# Patient Record
Sex: FEMALE | Race: WHITE | Employment: FULL TIME | URBAN - METROPOLITAN AREA
[De-identification: names, ages, dates, MRNs, and addresses within clinical notes are randomized per-mention and may not be internally consistent; named-entity substitution may affect disease eponyms.]

---

## 2023-12-14 DIAGNOSIS — R10.11 RIGHT UPPER QUADRANT PAIN: Primary | ICD-10-CM

## 2023-12-19 DIAGNOSIS — D72.828 OTHER ELEVATED WHITE BLOOD CELL (WBC) COUNT: Primary | ICD-10-CM

## 2024-01-05 ENCOUNTER — INITIAL PRENATAL (OUTPATIENT)
Dept: OBGYN CLINIC | Age: 29
End: 2024-01-05

## 2024-01-05 VITALS
HEIGHT: 59 IN | SYSTOLIC BLOOD PRESSURE: 102 MMHG | WEIGHT: 128.2 LBS | DIASTOLIC BLOOD PRESSURE: 72 MMHG | BODY MASS INDEX: 25.84 KG/M2

## 2024-01-05 DIAGNOSIS — Z87.09 HISTORY OF ASTHMA: ICD-10-CM

## 2024-01-05 DIAGNOSIS — Z83.3 FH: TYPE 1 DIABETES: ICD-10-CM

## 2024-01-05 DIAGNOSIS — Z3A.10 10 WEEKS GESTATION OF PREGNANCY: Primary | ICD-10-CM

## 2024-01-05 DIAGNOSIS — Z86.59 HISTORY OF ANXIETY: ICD-10-CM

## 2024-01-05 NOTE — PROGRESS NOTES
Value Ref Range    Testosterone 47 20 - 70 ng/dL    Sex Hormone Binding 59 30 - 135 nmol/L    Testosterone, Free 5.8 0.8 - 7.4 pg/mL    Testosterone, Bioavailable 13.5 2.2 - 20.6 ng/dL   DHEA-Sulfate    Collection Time: 10/09/23  8:34 AM   Result Value Ref Range    DHEAS (DHEA Sulfate) 360.0 65 - 380 ug/dL   T4, Free    Collection Time: 10/09/23  8:34 AM   Result Value Ref Range    T4 Free 1.3 0.9 - 1.7 ng/dL   TSH    Collection Time: 10/09/23  8:34 AM   Result Value Ref Range    TSH 4.35 0.30 - 5.00 uIU/mL   Follicle Stimulating Hormone    Collection Time: 10/09/23  8:34 AM   Result Value Ref Range    FSH 6.2 mIU/mL   POCT urine pregnancy    Collection Time: 10/25/23  1:40 PM   Result Value Ref Range    Preg Test, Ur Negative     Control Present    POCT Urinalysis no Micro    Collection Time: 10/25/23  1:40 PM   Result Value Ref Range    Color, UA Yellow     Clarity, UA Clear     Glucose, UA POC Negative     Bilirubin, UA Negative     Ketones, UA Negative     Spec Grav, UA <1.005     Blood, UA POC Trace-lysed     pH, UA 6.0     Protein, UA POC Negative     Urobilinogen, UA 0.2     Leukocytes, UA Negative     Nitrite, UA Negative    HIV Screen    Collection Time: 12/12/23  2:20 PM   Result Value Ref Range    HIV Ag/Ab NONREACTIVE NONREACTIVE   Comprehensive Metabolic Panel    Collection Time: 12/12/23  2:20 PM   Result Value Ref Range    Sodium 137 136 - 145 mmol/L    Potassium 4.1 3.7 - 5.3 mmol/L    Chloride 102 98 - 107 mmol/L    CO2 25 20 - 31 mmol/L    Anion Gap 10 9 - 16 mmol/L    Glucose 82 74 - 99 mg/dL    BUN 10 6 - 20 mg/dL    Creatinine 0.6 0.50 - 0.90 mg/dL    Est, Glom Filt Rate >60 >60 mL/min/1.73m2    Calcium 9.2 8.6 - 10.4 mg/dL    Total Protein 6.9 6.6 - 8.7 g/dL    Albumin 4.2 3.5 - 5.2 g/dL    Albumin/Globulin Ratio 2.0 1.0 - 2.5    Total Bilirubin 0.2 0.00 - 1.20 mg/dL    Alkaline Phosphatase 55 35 - 104 U/L    ALT 17 10 - 35 U/L    AST 20 10 - 35 U/L   Cystic fibrosis carrier study    Collection

## 2024-01-12 LAB
Lab: NORMAL
NTRA FETAL FRACTION: NORMAL
NTRA GENDER OF FETUS: NORMAL
NTRA MONOSOMY X AGE-BASED RISK TEXT: NORMAL
NTRA MONOSOMY X RESULT TEXT: NORMAL
NTRA MONOSOMY X RISK SCORE TEXT: NORMAL
NTRA TRIPLOIDY RESULT TEXT: NORMAL
NTRA TRISOMY 13 AGE-BASED RISK TEXT: NORMAL
NTRA TRISOMY 13 RESULT TEXT: NORMAL
NTRA TRISOMY 13 RISK SCORE TEXT: NORMAL
NTRA TRISOMY 18 AGE-BASED RISK TEXT: NORMAL
NTRA TRISOMY 18 RESULT TEXT: NORMAL
NTRA TRISOMY 18 RISK SCORE TEXT: NORMAL
NTRA TRISOMY 21 AGE-BASED RISK TEXT: NORMAL
NTRA TRISOMY 21 RESULT TEXT: NORMAL
NTRA TRISOMY 21 RISK SCORE TEXT: NORMAL

## 2024-01-18 PROBLEM — R87.611 ATYPICAL SQUAMOUS CELLS CANNOT EXCLUDE HIGH GRADE SQUAMOUS INTRAEPITHELIAL LESION ON CYTOLOGIC SMEAR OF CERVIX (ASC-H): Status: RESOLVED | Noted: 2023-05-01 | Resolved: 2024-01-18

## 2024-01-19 ENCOUNTER — HOSPITAL ENCOUNTER (OUTPATIENT)
Age: 29
Setting detail: SPECIMEN
Discharge: HOME | End: 2024-01-19

## 2024-01-19 ENCOUNTER — ROUTINE PRENATAL (OUTPATIENT)
Dept: OBGYN CLINIC | Age: 29
End: 2024-01-19

## 2024-01-19 VITALS
HEIGHT: 59 IN | SYSTOLIC BLOOD PRESSURE: 111 MMHG | DIASTOLIC BLOOD PRESSURE: 78 MMHG | WEIGHT: 129 LBS | BODY MASS INDEX: 26 KG/M2 | HEART RATE: 105 BPM

## 2024-01-19 DIAGNOSIS — Z83.49 FAMILY HISTORY OF HASHIMOTO THYROIDITIS: ICD-10-CM

## 2024-01-19 DIAGNOSIS — R42 DIZZINESS: ICD-10-CM

## 2024-01-19 DIAGNOSIS — Z3A.12 12 WEEKS GESTATION OF PREGNANCY: ICD-10-CM

## 2024-01-19 DIAGNOSIS — Z3A.12 12 WEEKS GESTATION OF PREGNANCY: Primary | ICD-10-CM

## 2024-01-19 DIAGNOSIS — Z86.59 HISTORY OF ANXIETY: ICD-10-CM

## 2024-01-19 LAB
EST. AVERAGE GLUCOSE BLD GHB EST-MCNC: 80 MG/DL
HBA1C MFR BLD: 4.4 % (ref 4–6)

## 2024-01-19 PROCEDURE — 0502F SUBSEQUENT PRENATAL CARE: CPT

## 2024-01-19 RX ORDER — FOLIC ACID, .BETA.-CAROTENE, ASCORBIC ACID, CHOLECALCIFEROL, .ALPHA.-TOCOPHEROL ACETATE, DL-, THIAMINE MONONITRATE, RIBOFLAVIN, NIACINAMIDE, PYRIDOXINE HYDROCHLORIDE, CYANOCOBALAMIN, CALCIUM PANTOTHENATE, CALCIUM CARBONATE, FERROUS FUMARATE, AND ZINC OXIDE 1; 1000; 100; 400; 30; 3; 3; 15; 20; 12; 7; 200; 29; 20 MG/1; [IU]/1; MG/1; [IU]/1; [IU]/1; MG/1; MG/1; MG/1; MG/1; UG/1; MG/1; MG/1; MG/1; MG/1
1 TABLET, CHEWABLE ORAL DAILY
Qty: 90 TABLET | Refills: 3 | Status: SHIPPED | OUTPATIENT
Start: 2024-01-19 | End: 2025-01-13

## 2024-01-19 NOTE — PROGRESS NOTES
[facility] [place]  [address]  [address]  [place] [address]  Dept: [phone]    PCP: Beulah Guidry, VANESSA - CNP     Chief Complaint   Patient presents with    Routine Prenatal Visit     12.6wks CAROLYN 7..24 ABO A+  [place]ckin 1.18.24  [employer] ins.    Pt states feels light headed, hrt spikes and can feel black out spells      Deyanira Marino  is a  who presents to the clinic today at 12w6d for routine prenatal monitoring. She has no unusual complaints. Reports no fetal movement, denies VB, ctx or LOF    Dizziness upon standing, vision goes black. This lasts less than 2 min. She does wear a fitness tracker and notes her HR goes up to 140 bpm, feels palpitations. This only happens first thing in the morning. At these times she feels faint but has not fainted    Staying hydrated. Drinking water and gatorade  Few episodes of diarrhea a few times per week but this is typically normal for her. She denies frequent vomiting. She is Snacking every 2 hours    There are no problems to display for this patient.     Review of Systems   Constitutional:  Negative for chills and fever.   Gastrointestinal:  Negative for constipation, nausea and vomiting.   Genitourinary:  Negative for decreased urine volume, difficulty urinating, dyspareunia, dysuria, frequency, hematuria, menstrual problem, pelvic pain, urgency, vaginal bleeding, vaginal discharge and vaginal pain.   Neurological:  Negative for headaches.   All other systems reviewed and are negative.   Denies unusual headaches, vision changes, RUQ pain    Vitals:    24 0842   BP: 111/78   Site: Left Upper Arm   Position: Sitting   Cuff Size: Small Adult   Pulse: (!) 105   Weight: 58.5 kg (129 lb)   Height: 1.499 m (4' 11\")    Blood pressure is WNL    Orthostatic BENJA  blood pressures:  -Laying down 122/80, hr 92 - slight dizziness in office going from laying position to sitting  -Sitting

## 2024-01-20 LAB
ALBUMIN SERPL-MCNC: 3.7 G/DL (ref 3.5–5.2)
ALBUMIN/GLOB SERPL: 1.4 {RATIO} (ref 1–2.5)
ALP SERPL-CCNC: 54 U/L (ref 35–104)
ALT SERPL-CCNC: 23 U/L (ref 5–33)
ANION GAP SERPL CALCULATED.3IONS-SCNC: 10 MMOL/L (ref 9–17)
AST SERPL-CCNC: 19 U/L
BILIRUB SERPL-MCNC: 0.2 MG/DL (ref 0.3–1.2)
BUN SERPL-MCNC: 9 MG/DL (ref 6–20)
CALCIUM SERPL-MCNC: 9 MG/DL (ref 8.6–10.4)
CHLORIDE SERPL-SCNC: 101 MMOL/L (ref 98–107)
CO2 SERPL-SCNC: 24 MMOL/L (ref 20–31)
CREAT SERPL-MCNC: 0.6 MG/DL (ref 0.5–0.9)
GFR SERPL CREATININE-BSD FRML MDRD: >60 ML/MIN/1.73M2
GLUCOSE SERPL-MCNC: 82 MG/DL (ref 70–99)
MAGNESIUM SERPL-MCNC: 1.8 MG/DL (ref 1.6–2.6)
POTASSIUM SERPL-SCNC: 4.2 MMOL/L (ref 3.7–5.3)
PROT SERPL-MCNC: 6.3 G/DL (ref 6.4–8.3)
SODIUM SERPL-SCNC: 135 MMOL/L (ref 135–144)
TSH SERPL DL<=0.05 MIU/L-ACNC: 2.69 UIU/ML (ref 0.3–5)

## 2024-01-23 ENCOUNTER — HOSPITAL ENCOUNTER (OUTPATIENT)
Age: 29
Setting detail: SPECIMEN
Discharge: HOME | End: 2024-01-23

## 2024-01-23 ENCOUNTER — OFFICE VISIT (OUTPATIENT)
Dept: FAMILY MEDICINE CLINIC | Age: 29
End: 2024-01-23
Payer: COMMERCIAL

## 2024-01-23 VITALS
HEART RATE: 96 BPM | OXYGEN SATURATION: 99 % | RESPIRATION RATE: 14 BRPM | DIASTOLIC BLOOD PRESSURE: 68 MMHG | SYSTOLIC BLOOD PRESSURE: 120 MMHG

## 2024-01-23 DIAGNOSIS — L02.91 ABSCESS: Primary | ICD-10-CM

## 2024-01-23 DIAGNOSIS — L02.91 ABSCESS: ICD-10-CM

## 2024-01-23 PROCEDURE — 99213 OFFICE O/P EST LOW 20 MIN: CPT | Performed by: NURSE PRACTITIONER

## 2024-01-23 RX ORDER — CEPHALEXIN 500 MG/1
500 CAPSULE ORAL 4 TIMES DAILY
Qty: 20 CAPSULE | Refills: 0 | Status: SHIPPED | OUTPATIENT
Start: 2024-01-23 | End: 2024-01-28

## 2024-01-23 ASSESSMENT — ENCOUNTER SYMPTOMS
VOMITING: 0
COUGH: 0
SORE THROAT: 0
DIARRHEA: 0
ABDOMINAL PAIN: 0
RHINORRHEA: 0

## 2024-01-23 NOTE — PROGRESS NOTES
Result Value Ref Range    Sodium 135 135 - 144 mmol/L    Potassium 4.2 3.7 - 5.3 mmol/L    Chloride 101 98 - 107 mmol/L    CO2 24 20 - 31 mmol/L    Anion Gap 10 9 - 17 mmol/L    Glucose 82 70 - 99 mg/dL    BUN 9 6 - 20 mg/dL    Creatinine 0.6 0.5 - 0.9 mg/dL    Est, Glom Filt Rate >60 >60 mL/min/1.73m2    Calcium 9.0 8.6 - 10.4 mg/dL    Total Protein 6.3 (L) 6.4 - 8.3 g/dL    Albumin 3.7 3.5 - 5.2 g/dL    Albumin/Globulin Ratio 1.4 1.0 - 2.5    Total Bilirubin 0.2 (L) 0.3 - 1.2 mg/dL    Alkaline Phosphatase 54 35 - 104 U/L    ALT 23 5 - 33 U/L    AST 19 <32 U/L       Patient counseled:   Wound lanced with 11 blade and drained. Numbed topically with lidocaine soaked gauze and cleaned with betadine prior to procedure. Culture taken. Pt is 13 weeks pregnant.   Take antibiotics as directed for entire duration, not just until you are feeling better. It is better to take this medication with food to avoid an upset stomach. If your symptoms significantly worsen or fail to improve with medication, please do not hesitate to return for further evaluation or present to the ER.      Take antibiotics as directed for entire duration, not just until you are feeling better. It is better to take this medication with food to avoid an upset stomach. If your symptoms significantly worsen or fail to improve with medication, please do not hesitate to return for further evaluation or present to the ER.      Patient given educational materials, see patient instructions.    Discussed use, benefit, and side effects of prescribed medications.    All patient questions answered. Pt verbalized understanding.    Instructed to continue current medications, diet and exercise.    Patient agreed with treatment plan. Follow up as directed.     Electronically signed by VANESSA Grace CNP on 1/23/2024 at 4:28 PM

## 2024-01-25 DIAGNOSIS — L02.91 ABSCESS: Primary | ICD-10-CM

## 2024-01-25 LAB
MICROORGANISM SPEC CULT: NORMAL
MICROORGANISM/AGENT SPEC: NORMAL
MICROORGANISM/AGENT SPEC: NORMAL
SPECIMEN DESCRIPTION: NORMAL

## 2024-01-26 ENCOUNTER — TELEPHONE (OUTPATIENT)
Dept: PRIMARY CARE CLINIC | Age: 29
End: 2024-01-26

## 2024-01-26 DIAGNOSIS — L02.91 ABSCESS: Primary | ICD-10-CM

## 2024-01-26 NOTE — TELEPHONE ENCOUNTER
----- Message from Lilia Dinh sent at 1/26/2024 10:10 AM EST -----  Subject: Referral Request    Reason for referral request? Pt would like a referral put in for a   different Dermatologist office due to not being able to get an appt until   June for prev referral   Provider patient wants to be referred to(if known):     Provider Phone Number(if known):    Additional Information for Provider?   ---------------------------------------------------------------------------  --------------  CALL BACK INFO    [phone]; OK to leave message on voicemail  ---------------------------------------------------------------------------  --------------

## 2024-02-02 ENCOUNTER — OFFICE VISIT (OUTPATIENT)
Dept: FAMILY MEDICINE CLINIC | Age: 29
End: 2024-02-02
Payer: COMMERCIAL

## 2024-02-02 VITALS
TEMPERATURE: 97.8 F | DIASTOLIC BLOOD PRESSURE: 74 MMHG | BODY MASS INDEX: 26.3 KG/M2 | WEIGHT: 130.2 LBS | SYSTOLIC BLOOD PRESSURE: 116 MMHG | OXYGEN SATURATION: 97 % | HEART RATE: 100 BPM

## 2024-02-02 DIAGNOSIS — R35.0 URINARY FREQUENCY: ICD-10-CM

## 2024-02-02 DIAGNOSIS — R82.998 LEUKOCYTES IN URINE: Primary | ICD-10-CM

## 2024-02-02 DIAGNOSIS — N89.8 VAGINAL DISCHARGE: ICD-10-CM

## 2024-02-02 LAB
BILIRUBIN, POC: NEGATIVE
BLOOD URINE, POC: NEGATIVE
CLARITY, POC: CLEAR
COLOR, POC: YELLOW
GLUCOSE URINE, POC: NEGATIVE
KETONES, POC: NEGATIVE
LEUKOCYTE EST, POC: NORMAL
NITRITE, POC: NEGATIVE
PH, POC: 7.5
PROTEIN, POC: NEGATIVE
SPECIFIC GRAVITY, POC: 1.01
UROBILINOGEN, POC: 0.2

## 2024-02-02 PROCEDURE — 81003 URINALYSIS AUTO W/O SCOPE: CPT | Performed by: NURSE PRACTITIONER

## 2024-02-02 PROCEDURE — 99214 OFFICE O/P EST MOD 30 MIN: CPT | Performed by: NURSE PRACTITIONER

## 2024-02-02 ASSESSMENT — ENCOUNTER SYMPTOMS
VOMITING: 0
NAUSEA: 0

## 2024-02-02 ASSESSMENT — PATIENT HEALTH QUESTIONNAIRE - PHQ9
SUM OF ALL RESPONSES TO PHQ QUESTIONS 1-9: 0
1. LITTLE INTEREST OR PLEASURE IN DOING THINGS: 0
SUM OF ALL RESPONSES TO PHQ9 QUESTIONS 1 & 2: 0
2. FEELING DOWN, DEPRESSED OR HOPELESS: 0
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
## 2024-02-03 ENCOUNTER — HOSPITAL ENCOUNTER (OUTPATIENT)
Age: 29
Setting detail: SPECIMEN
Discharge: HOME | End: 2024-02-03

## 2024-02-03 DIAGNOSIS — R35.0 URINARY FREQUENCY: ICD-10-CM

## 2024-02-03 DIAGNOSIS — N89.8 VAGINAL DISCHARGE: ICD-10-CM

## 2024-02-04 LAB
CANDIDA SPECIES: NEGATIVE
GARDNERELLA VAGINALIS: NEGATIVE
MICROORGANISM SPEC CULT: NO GROWTH
SOURCE: NORMAL
SPECIMEN DESCRIPTION: NORMAL
TRICHOMONAS: NEGATIVE

## 2024-02-15 NOTE — PROGRESS NOTES
Deyanira is a  @ 16w6d who presents for LATANYA visit.  She denies LOF, VB or Ctxs.  + FM.  She denies any complaints at this time except for some round ligament pain.  She had a bump behind her ear that she went to walk in clinic and treated there and it is smaller, no infection.  She has a h/o anxiety and was on lexapro previously.  She is wanting to try to avoid medications if possible because weaning off the medication was so bad previously.  She denies any fevers/chills, SOB, cough, sore throat, loss of taste/smell or sick contacts.  Pt denies any HA, vision changes or RUQ pain.     O:  Vitals:    24 0922   BP: 128/81   Pulse: (!) 106     Gen: NAD  Abd: soft, nontender, gravid  Ext:  no edema      BP: 128/81  Weight - Scale: 59.9 kg (132 lb)  Pulse: (!) 106  Patient Position: Sitting  Fetal HR: 140  Movement: Present    A/P:  Patient Active Problem List    Diagnosis Date Noted    Encounter for supervision of normal first pregnancy in second trimester 2024     - Discussed updated COVID precautions and policies. Reviewed updated visitor policy. Encouraged social distancing and appropriate hand washing/hygiene practices. Reviewed symptoms suspicious for COVID infection. Discussed that ACOG, SMFM, and the CDC recommend to not withold immunization in pregnant and breastfeeding women who meet criteria for receipt of the vaccine based on the ACIP recommended priority groups. All questions answered. Patient vocalized understanding.    - RSV vaccination (32-36 weeks): The patient was counseled on benefits to her baby if she receives the Pfizer RSV vaccine (Abrysvo) during pregnancy. She was informed that this vaccination is FDA approved for use during pregnancy. She will think about it and call local pharmacies       Anxiety - sees therapist.  Considering EMDR vs restarting lexapro.  Pt will let us know what she would like to do  Discussed s/sx that should prompt call to the office  Discussed kick counts  Pt

## 2024-02-16 ENCOUNTER — ROUTINE PRENATAL (OUTPATIENT)
Dept: OBGYN CLINIC | Age: 29
End: 2024-02-16

## 2024-02-16 VITALS
DIASTOLIC BLOOD PRESSURE: 81 MMHG | HEART RATE: 106 BPM | BODY MASS INDEX: 26.66 KG/M2 | SYSTOLIC BLOOD PRESSURE: 128 MMHG | WEIGHT: 132 LBS

## 2024-02-16 DIAGNOSIS — Z34.02 ENCOUNTER FOR SUPERVISION OF NORMAL FIRST PREGNANCY IN SECOND TRIMESTER: Primary | ICD-10-CM

## 2024-02-16 DIAGNOSIS — Z3A.16 16 WEEKS GESTATION OF PREGNANCY: ICD-10-CM

## 2024-03-06 ENCOUNTER — TELEPHONE (OUTPATIENT)
Dept: OBGYN CLINIC | Age: 29
End: 2024-03-06

## 2024-03-06 ENCOUNTER — HOSPITAL ENCOUNTER (OUTPATIENT)
Age: 29
Setting detail: SPECIMEN
Discharge: HOME | End: 2024-03-06

## 2024-03-06 DIAGNOSIS — Z34.02 ENCOUNTER FOR SUPERVISION OF NORMAL FIRST PREGNANCY IN SECOND TRIMESTER: ICD-10-CM

## 2024-03-06 DIAGNOSIS — R35.0 FREQUENCY OF URINATION: ICD-10-CM

## 2024-03-06 DIAGNOSIS — Z3A.19 19 WEEKS GESTATION OF PREGNANCY: ICD-10-CM

## 2024-03-06 DIAGNOSIS — Z3A.19 19 WEEKS GESTATION OF PREGNANCY: Primary | ICD-10-CM

## 2024-03-06 LAB
BACTERIA URNS QL MICRO: NORMAL
BILIRUB UR QL STRIP: NEGATIVE
CASTS #/AREA URNS LPF: NORMAL /LPF (ref 0–8)
CLARITY UR: CLEAR
COLOR UR: YELLOW
EPI CELLS #/AREA URNS HPF: NORMAL /HPF (ref 0–5)
GLUCOSE UR STRIP-MCNC: NEGATIVE MG/DL
HGB UR QL STRIP.AUTO: NEGATIVE
KETONES UR STRIP-MCNC: NEGATIVE MG/DL
LEUKOCYTE ESTERASE UR QL STRIP: ABNORMAL
NITRITE UR QL STRIP: NEGATIVE
PH UR STRIP: 7 [PH] (ref 5–8)
PROT UR STRIP-MCNC: NEGATIVE MG/DL
RBC #/AREA URNS HPF: NORMAL /HPF (ref 0–4)
SP GR UR STRIP: 1.01 (ref 1–1.03)
UROBILINOGEN UR STRIP-ACNC: NORMAL EU/DL (ref 0–1)
WBC #/AREA URNS HPF: NORMAL /HPF (ref 0–5)

## 2024-03-06 NOTE — TELEPHONE ENCOUNTER
LMP: 19.4 wks     Last Seen: 02.16.24    Next Appt: 03.11.24    C/O  Urinary Frequency  Lower pelvic pain ( short instances)   Normal BM 03.06.24 morning  Diarrhea not later in day 03.06.24    Pt stated she has been waking up in the morning a few times with sharp/ poking pelvic pain that is relived once she goes to bathroom, pt feels high urgency need of release in morning. Pt wants to know if her bladder puts strains on baby, since she is not waking up to go to bathroom until the urgency. Pt having high frequency of urination of feeling the need to go about every 15 mins.     Advised pt that we would like a UA/UC done to test for a UTI. That if her pain returns or becomes more frequent she can try 1000mg of extra strength tylenol every 6 hrs, use a heating pad mid heat in intervals over effected area and try soaking in warm bath. Call office with any changes or worsening of symptoms. Will f/u w/ provider for POC.    Pt stated she would be able to go to lab.    Please Advise

## 2024-03-07 PROBLEM — R82.71 GBS BACTERIURIA: Status: ACTIVE | Noted: 2024-03-07

## 2024-03-07 LAB
MICROORGANISM SPEC CULT: ABNORMAL
SPECIMEN DESCRIPTION: ABNORMAL

## 2024-03-11 ENCOUNTER — ROUTINE PRENATAL (OUTPATIENT)
Dept: PERINATAL CARE | Age: 29
End: 2024-03-11
Payer: COMMERCIAL

## 2024-03-11 VITALS
TEMPERATURE: 98.6 F | HEART RATE: 90 BPM | SYSTOLIC BLOOD PRESSURE: 108 MMHG | DIASTOLIC BLOOD PRESSURE: 74 MMHG | HEIGHT: 59 IN | RESPIRATION RATE: 16 BRPM | BODY MASS INDEX: 27.82 KG/M2 | WEIGHT: 138 LBS

## 2024-03-11 DIAGNOSIS — O43.102 PLACENTAL ABNORMALITY IN SECOND TRIMESTER: Primary | ICD-10-CM

## 2024-03-11 DIAGNOSIS — Z36.86 ENCOUNTER FOR SCREENING FOR RISK OF PRE-TERM LABOR: ICD-10-CM

## 2024-03-11 DIAGNOSIS — Z3A.20 20 WEEKS GESTATION OF PREGNANCY: ICD-10-CM

## 2024-03-11 PROCEDURE — 76817 TRANSVAGINAL US OBSTETRIC: CPT | Performed by: OBSTETRICS & GYNECOLOGY

## 2024-03-11 PROCEDURE — 99999 PR OFFICE/OUTPT VISIT,PROCEDURE ONLY: CPT | Performed by: OBSTETRICS & GYNECOLOGY

## 2024-03-11 PROCEDURE — 76811 OB US DETAILED SNGL FETUS: CPT | Performed by: OBSTETRICS & GYNECOLOGY

## 2024-03-11 NOTE — PROGRESS NOTES
Obstetric/Gynecology Maternal Fetal Medicine Resident Note    Patient has declined maternal genetic carrier screening and AFP.  She has already had a NIPT performed.    Sabrina Agrawal MD  OBGYN Resident, PGY1  [facility]  3/11/2024, 9:33 AM

## 2024-03-11 NOTE — PROGRESS NOTES
Please refer to non-invasive prenatal testing/NIPT results (via [facility]).     Patient has declined maternal carrier testing ([employer] Carrier Screen).    MSAFP (maternal serum alpha-feto protein level) lab draw also declined by patient.     Please refer to Maternal-Fetal Medicine OBGYN resident progress note in EPIC.

## 2024-03-14 ENCOUNTER — ROUTINE PRENATAL (OUTPATIENT)
Dept: OBGYN CLINIC | Age: 29
End: 2024-03-14

## 2024-03-14 VITALS
SYSTOLIC BLOOD PRESSURE: 116 MMHG | DIASTOLIC BLOOD PRESSURE: 77 MMHG | HEART RATE: 112 BPM | WEIGHT: 137 LBS | BODY MASS INDEX: 27.67 KG/M2

## 2024-03-14 DIAGNOSIS — T14.90XA TRAUMA: ICD-10-CM

## 2024-03-14 DIAGNOSIS — G98.8 NEUROLOGIC DISORDER: ICD-10-CM

## 2024-03-14 DIAGNOSIS — Z34.02 ENCOUNTER FOR SUPERVISION OF NORMAL FIRST PREGNANCY IN SECOND TRIMESTER: Primary | ICD-10-CM

## 2024-03-14 DIAGNOSIS — Z3A.20 20 WEEKS GESTATION OF PREGNANCY: ICD-10-CM

## 2024-03-14 NOTE — PROGRESS NOTES
Prenatal Visit    Deyanira Marino is a 28 y.o. female  at 20w5d IUP    The patient was seen and evaluated. She has no complaints today.   Reports positive fetal movements. She denies headache, vision changes, RUQ pain, contractions, vaginal bleeding and leakage of fluid.     The problem list reflects the active issues addressed during today's visit    Vitals:     BP: 116/77  Weight - Scale: 62.1 kg (137 lb)  Pulse: (!) 112  Patient Position: Sitting  Fetal HR: 138  Movement: Present     PHYSICAL:   General appearance: no apparent distress, alert and cooperative  HEENT: head atraumatic, normocephalic, trachea midline, moist mucous membranes   Neurologic: alert, oriented, normal speech   Lungs: no increased work of breathing,   Abdomen: soft, gravid, non-tender on palpation    Musculoskeletal: no gross abnormalities, range of motion appropriate for age   Psychiatric: mood appropriate, normal affect      Assessment & Plan:  Deyanira Marino is a 28 y.o. female  at 20w5d IUP   - VSS     - Prenatal labs reviewed    - GBS bacteriuria noted    - NIPT reviewed and low risk    - The patients anatomy ultrasound has been completed and reviewed with patient. Next [facility] appointment on 24  - Discussion held about getting AFP completed but she has declined since anatomy scan was wnl    - She wants maternal carrier screening completed, will order/collect today    - Continue taking prenatal vitamins QD    - Rhogam not indicated this pregnancy   - Influenza vaccination: done    - S/p COVID-19 vaccination x3  Return in about 4 weeks (around 2024) for LATANYA.        Orders Placed This Encounter   Procedures    [employer] ([employer])       ==========Department Information==========  ID: [id number]  Department:[facility]  [address] [place], [place] [address]  [address]  [place] [id number]  Dept: [phone]  Dept Fax: [phone]     Order Specific

## 2024-03-16 NOTE — PROGRESS NOTES
approved for use during pregnancy. She will think about it and call local pharmacies       Discussed s/sx that should prompt call to the office  Discussed kick counts  Pt counseled to continue PNVs  RTC in 4 wks    Lilia Pappas MD

## 2024-03-18 ENCOUNTER — ROUTINE PRENATAL (OUTPATIENT)
Dept: OBGYN CLINIC | Age: 29
End: 2024-03-18

## 2024-03-18 VITALS
BODY MASS INDEX: 27.87 KG/M2 | WEIGHT: 138 LBS | HEART RATE: 106 BPM | DIASTOLIC BLOOD PRESSURE: 71 MMHG | SYSTOLIC BLOOD PRESSURE: 102 MMHG

## 2024-03-18 DIAGNOSIS — Z34.02 ENCOUNTER FOR SUPERVISION OF NORMAL FIRST PREGNANCY IN SECOND TRIMESTER: ICD-10-CM

## 2024-03-18 DIAGNOSIS — Z3A.21 21 WEEKS GESTATION OF PREGNANCY: Primary | ICD-10-CM

## 2024-03-18 LAB
Lab: NORMAL

## 2024-03-18 PROCEDURE — 0502F SUBSEQUENT PRENATAL CARE: CPT | Performed by: OBSTETRICS & GYNECOLOGY

## 2024-03-22 ENCOUNTER — TELEPHONE (OUTPATIENT)
Dept: OBGYN CLINIC | Age: 29
End: 2024-03-22

## 2024-03-22 DIAGNOSIS — Z34.02 ENCOUNTER FOR SUPERVISION OF NORMAL FIRST PREGNANCY IN SECOND TRIMESTER: ICD-10-CM

## 2024-03-22 DIAGNOSIS — G98.8 NEUROLOGIC DISORDER: ICD-10-CM

## 2024-03-22 DIAGNOSIS — Z83.3 FH: TYPE 1 DIABETES: ICD-10-CM

## 2024-03-22 DIAGNOSIS — Z3A.21 21 WEEKS GESTATION OF PREGNANCY: Primary | ICD-10-CM

## 2024-03-22 DIAGNOSIS — Z83.49 FAMILY HISTORY OF HASHIMOTO THYROIDITIS: ICD-10-CM

## 2024-03-22 NOTE — TELEPHONE ENCOUNTER
21.6 wks    Pt calling in regarding the saliva reji test she had done on 3/18. Pt us wondering if this is something she has to repeat or not as the result note from Dr. Rincon on there states )Can we look into this result to determine if they can re run her test? Or do they need to get new blood since the tubes were ?)    Isis can you please look into this? Thanks

## 2024-03-25 ENCOUNTER — TELEPHONE (OUTPATIENT)
Dept: OBGYN CLINIC | Age: 29
End: 2024-03-25

## 2024-03-25 RX ORDER — METRONIDAZOLE 7.5 MG/G
1 GEL VAGINAL DAILY
Qty: 70 G | Refills: 0 | Status: SHIPPED | OUTPATIENT
Start: 2024-03-25 | End: 2024-03-30

## 2024-03-25 NOTE — TELEPHONE ENCOUNTER
22.2wks    Pt calling in stating she is having some yellow slimy discharge and then it turned into a white chunky discharge and she is now experiencing some vaginal irritation. Pt is asking if she can have something to help as its getting uncomfortable and a little dull pain at times.

## 2024-03-28 ENCOUNTER — HOSPITAL ENCOUNTER (OUTPATIENT)
Age: 29
Setting detail: SPECIMEN
Discharge: HOME | End: 2024-03-28

## 2024-03-28 ENCOUNTER — ROUTINE PRENATAL (OUTPATIENT)
Dept: OBGYN CLINIC | Age: 29
End: 2024-03-28

## 2024-03-28 VITALS
SYSTOLIC BLOOD PRESSURE: 121 MMHG | BODY MASS INDEX: 28.16 KG/M2 | HEART RATE: 90 BPM | DIASTOLIC BLOOD PRESSURE: 84 MMHG | WEIGHT: 139.4 LBS

## 2024-03-28 DIAGNOSIS — Z3A.22 22 WEEKS GESTATION OF PREGNANCY: ICD-10-CM

## 2024-03-28 DIAGNOSIS — N89.8 VAGINAL DISCHARGE: ICD-10-CM

## 2024-03-28 DIAGNOSIS — Z34.02 ENCOUNTER FOR SUPERVISION OF NORMAL FIRST PREGNANCY IN SECOND TRIMESTER: Primary | ICD-10-CM

## 2024-03-28 LAB
BACTERIA URNS QL MICRO: NORMAL
BILIRUB UR QL STRIP: NEGATIVE
CANDIDA SPECIES: NEGATIVE
CASTS #/AREA URNS LPF: NORMAL /LPF (ref 0–8)
CLARITY UR: CLEAR
COLOR UR: YELLOW
EPI CELLS #/AREA URNS HPF: NORMAL /HPF (ref 0–5)
GARDNERELLA VAGINALIS: POSITIVE
GLUCOSE UR STRIP-MCNC: NEGATIVE MG/DL
HGB UR QL STRIP.AUTO: NEGATIVE
KETONES UR STRIP-MCNC: NEGATIVE MG/DL
LEUKOCYTE ESTERASE UR QL STRIP: NEGATIVE
NITRITE UR QL STRIP: NEGATIVE
PH UR STRIP: 6.5 [PH] (ref 5–8)
PROT UR STRIP-MCNC: NEGATIVE MG/DL
RBC #/AREA URNS HPF: NORMAL /HPF (ref 0–4)
SOURCE: ABNORMAL
SP GR UR STRIP: 1 (ref 1–1.03)
TRICHOMONAS: NEGATIVE
UROBILINOGEN UR STRIP-ACNC: NORMAL EU/DL (ref 0–1)
WBC #/AREA URNS HPF: NORMAL /HPF (ref 0–5)

## 2024-03-28 PROCEDURE — 0502F SUBSEQUENT PRENATAL CARE: CPT | Performed by: NURSE PRACTITIONER

## 2024-03-28 NOTE — PROGRESS NOTES
Prenatal Visit    Deyanira Marino is a 28 y.o. female  at 22w5d    The patient was seen and evaluated. Reports positive fetal movements. She denies headache, vision changes, RUQ pain, contractions, vaginal bleeding and leakage of fluid.  She has been having a vaginal discharge and was ordered metrogel but didn't want to take it if she didn't have and infection.  She has had a couple instances of tachycardia at rest that resolved spontaneously after a few minutes    The patient already received the influenza vaccine this year.     The problem list reflects the active issues addressed during today's visit    Vitals:    BP: 121/84  Weight - Scale: 63.2 kg (139 lb 6.4 oz)  Pulse: 90  Fundal Height (cm): 22 cm  Fetal HR: 143  Movement: Present     Labs:  The patient is RH +, Rhogam not indicated  ABO/Rh   Date Value Ref Range Status   2023 A POSITIVE  Final       1 hour GTT test is ordered    Assessment & Plan:  Deyanira Marino is a 28 y.o. female  at 22w5d   - 28 week labs ordered   - affirm and urine culture today   - will monitor for tachycardia and let us know if it continues to happen and/or increase in frequency   - NIPT low risk, female   - The patients anatomy ultrasound has been completed and reviewed with patient.    - Next [facility] appointment 2024   - Continue taking Prenatal Vitamin.   - The ACIP recommended pregnant patients be included in phase 1C of vaccine distribution. This decision is supported by [facility] and ACOG. As of 2021, there have been over 30,000 pregnant patients included in the V-safe post COVID vaccination safety . Most (73%) reports to VAERS among pregnant women involved non-pregnancyspecific adverse events (e.g., local and systemic reactions). Miscarriage was the most frequently reported pregnancy-specific adverse event to VAERS; numbers are within the known background rates based on presumed COVID-19 vaccine doses administered to pregnant women. No

## 2024-03-29 LAB
EGFR GENE MUT TESTED BLD/T: NEGATIVE
KRAS GENE MUT ANL BLD/T: NEGATIVE
Lab: NEGATIVE
Lab: NORMAL
MICROARRAY PLATFORM: NEGATIVE
MICROORGANISM SPEC CULT: ABNORMAL
NTRA ALPHA-THALASSEMIA: NEGATIVE
NTRA BATTEN DISEASE (NEURONAL CEROID LIPOFUSCINOSIS, CLN3-RELATED): NEGATIVE
NTRA BETA-HEMOGLOBINOPATHIES: NEGATIVE
NTRA BLOOM SYNDROME: NEGATIVE
NTRA CANAVAN DISEASE: NEGATIVE
NTRA CITRULLINEMIA, TYPE I: NEGATIVE
NTRA CYSTIC FIBROSIS: NEGATIVE
NTRA DUCHENNE/BECKER MUSCULAR DYSTROPHY: NEGATIVE
NTRA FAMILIAL DYSAUTONOMIA: NEGATIVE
NTRA FANCONI ANEMIA, GROUP C: NEGATIVE
NTRA FRAGILE X SYNDROME: NEGATIVE
NTRA GALACTOSEMIA: NEGATIVE
NTRA GAUCHER DISEASE: NEGATIVE
NTRA GLYCOGEN STORAGE DISEASE, TYPE 1A: NEGATIVE
NTRA ISOVALERIC ACIDEMIA: NEGATIVE
NTRA MEDIUM CHAIN ACYL-COA DEHYDROGENASE DEFICIENCY: NEGATIVE
NTRA METHYLMALONIC ACIDURIA AND HOMOCYSTINURIA, TYPE CBLC: NEGATIVE
NTRA MUCOLIPIDOSIS, TYPE IV: NEGATIVE
NTRA POLYCYSTIC KIDNEY DISEASE, AUTOSOMAL RECESSIVE: NEGATIVE
NTRA SMITH-LEMLI-OPITZ SYNDROME: NEGATIVE
NTRA SPINAL MUSCULAR ATROPHY: NEGATIVE
NTRA TAY-SACHS DISEASE: NEGATIVE
NTRA TYROSINEMIA, TYPE I: NEGATIVE
NTRA ZELLWEGER SPECTRUM DISORDERS, PEX1-RELATED: NEGATIVE
SPECIMEN DESCRIPTION: ABNORMAL
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
## 2024-04-12 ENCOUNTER — ROUTINE PRENATAL (OUTPATIENT)
Dept: OBGYN CLINIC | Age: 29
End: 2024-04-12

## 2024-04-12 ENCOUNTER — HOSPITAL ENCOUNTER (OUTPATIENT)
Age: 29
Setting detail: SPECIMEN
Discharge: HOME | End: 2024-04-12

## 2024-04-12 VITALS
WEIGHT: 142.8 LBS | HEART RATE: 125 BPM | BODY MASS INDEX: 28.84 KG/M2 | SYSTOLIC BLOOD PRESSURE: 124 MMHG | DIASTOLIC BLOOD PRESSURE: 88 MMHG

## 2024-04-12 DIAGNOSIS — Z3A.24 24 WEEKS GESTATION OF PREGNANCY: Primary | ICD-10-CM

## 2024-04-12 DIAGNOSIS — Z3A.24 24 WEEKS GESTATION OF PREGNANCY: ICD-10-CM

## 2024-04-12 DIAGNOSIS — Z34.02 ENCOUNTER FOR SUPERVISION OF NORMAL FIRST PREGNANCY IN SECOND TRIMESTER: ICD-10-CM

## 2024-04-12 LAB
GLUCOSE 1H P 50 G GLC PO SERPL-MCNC: 119 MG/DL (ref 70–135)
GLUCOSE ADMINISTRATION: NORMAL
T PALLIDUM AB SER QL IA: NONREACTIVE

## 2024-04-12 PROCEDURE — 0502F SUBSEQUENT PRENATAL CARE: CPT | Performed by: OBSTETRICS & GYNECOLOGY

## 2024-05-08 ENCOUNTER — TELEPHONE (OUTPATIENT)
Dept: OBGYN CLINIC | Age: 29
End: 2024-05-08

## 2024-05-08 PROBLEM — Z34.02 ENCOUNTER FOR SUPERVISION OF NORMAL FIRST PREGNANCY IN SECOND TRIMESTER: Status: RESOLVED | Noted: 2024-02-16 | Resolved: 2024-05-08

## 2024-05-08 NOTE — TELEPHONE ENCOUNTER
28.4wks    Pt calling in stating she was sitting playing card games with her kids and she started to feel lightheaded dizzy and felt like she was having palpitations and is wondering what she should do. Asked if she is still having symptoms she states no and it only lasted a short time. Pt asked how her fluid intake has been and she states good that she got up early and has been drinking water since she got.     Spoke with Dr. Pappas who states to elevated feet relax and keep drinking fluids and to monitor symptoms and to go to the ER if she experiences those symptoms again.       Pt verbalized understanding.

## 2024-05-09 ENCOUNTER — HOSPITAL ENCOUNTER (OUTPATIENT)
Age: 29
Setting detail: OBSERVATION
Discharge: HOME | End: 2024-05-10
Attending: STUDENT IN AN ORGANIZED HEALTH CARE EDUCATION/TRAINING PROGRAM | Admitting: OBSTETRICS & GYNECOLOGY
Payer: COMMERCIAL

## 2024-05-09 ENCOUNTER — ROUTINE PRENATAL (OUTPATIENT)
Dept: OBGYN CLINIC | Age: 29
End: 2024-05-09
Payer: COMMERCIAL

## 2024-05-09 VITALS
WEIGHT: 147.6 LBS | DIASTOLIC BLOOD PRESSURE: 88 MMHG | BODY MASS INDEX: 29.81 KG/M2 | SYSTOLIC BLOOD PRESSURE: 132 MMHG | HEART RATE: 128 BPM

## 2024-05-09 DIAGNOSIS — Z23 NEED FOR TDAP VACCINATION: ICD-10-CM

## 2024-05-09 DIAGNOSIS — Z3A.28 28 WEEKS GESTATION OF PREGNANCY: ICD-10-CM

## 2024-05-09 DIAGNOSIS — R00.0 TACHYCARDIA: ICD-10-CM

## 2024-05-09 DIAGNOSIS — O09.92 HIGH-RISK PREGNANCY IN SECOND TRIMESTER: Primary | ICD-10-CM

## 2024-05-09 DIAGNOSIS — R42 DIZZINESS: ICD-10-CM

## 2024-05-09 DIAGNOSIS — R87.611 ATYPICAL SQUAMOUS CELLS CANNOT EXCLUDE HIGH GRADE SQUAMOUS INTRAEPITHELIAL LESION ON CYTOLOGIC SMEAR OF CERVIX (ASC-H): ICD-10-CM

## 2024-05-09 DIAGNOSIS — R51.9 INTRACTABLE HEADACHE, UNSPECIFIED CHRONICITY PATTERN, UNSPECIFIED HEADACHE TYPE: ICD-10-CM

## 2024-05-09 DIAGNOSIS — O16.9 ELEVATED BLOOD PRESSURE AFFECTING PREGNANCY, ANTEPARTUM: ICD-10-CM

## 2024-05-09 LAB
ABO + RH BLD: NORMAL
ALBUMIN SERPL-MCNC: 3.7 G/DL (ref 3.5–5.2)
ALBUMIN/GLOB SERPL: 1 {RATIO} (ref 1–2.5)
ALP SERPL-CCNC: 94 U/L (ref 35–104)
ALT SERPL-CCNC: 67 U/L (ref 10–35)
ANION GAP SERPL CALCULATED.3IONS-SCNC: 9 MMOL/L (ref 9–16)
ARM BAND NUMBER: NORMAL
AST SERPL-CCNC: 39 U/L (ref 10–35)
BASOPHILS # BLD: 0.05 K/UL (ref 0–0.2)
BASOPHILS NFR BLD: 0 % (ref 0–2)
BILIRUB SERPL-MCNC: 0.2 MG/DL (ref 0–1.2)
BLOOD BANK SAMPLE EXPIRATION: NORMAL
BLOOD GROUP ANTIBODIES SERPL: NEGATIVE
BUN SERPL-MCNC: 9 MG/DL (ref 6–20)
CALCIUM SERPL-MCNC: 8.9 MG/DL (ref 8.6–10.4)
CHLORIDE SERPL-SCNC: 105 MMOL/L (ref 98–107)
CO2 SERPL-SCNC: 24 MMOL/L (ref 20–31)
CREAT SERPL-MCNC: 0.5 MG/DL (ref 0.5–0.9)
CREAT UR-MCNC: 45.4 MG/DL (ref 28–217)
EOSINOPHIL # BLD: 0.13 K/UL (ref 0–0.44)
EOSINOPHILS RELATIVE PERCENT: 1 % (ref 1–4)
ERYTHROCYTE [DISTWIDTH] IN BLOOD BY AUTOMATED COUNT: 12.3 % (ref 11.8–14.4)
GFR, ESTIMATED: >90 ML/MIN/1.73M2
GLUCOSE SERPL-MCNC: 59 MG/DL (ref 74–99)
HAV IGM SERPL QL IA: NONREACTIVE
HBV CORE IGM SERPL QL IA: NONREACTIVE
HBV SURFACE AG SERPL QL IA: NONREACTIVE
HCT VFR BLD AUTO: 34.1 % (ref 36.3–47.1)
HCV AB SERPL QL IA: NONREACTIVE
HGB BLD-MCNC: 11.8 G/DL (ref 11.9–15.1)
IMM GRANULOCYTES # BLD AUTO: 0.33 K/UL (ref 0–0.3)
IMM GRANULOCYTES NFR BLD: 3 %
LYMPHOCYTES NFR BLD: 1.82 K/UL (ref 1.1–3.7)
LYMPHOCYTES RELATIVE PERCENT: 14 % (ref 24–43)
MCH RBC QN AUTO: 33.8 PG (ref 25.2–33.5)
MCHC RBC AUTO-ENTMCNC: 34.6 G/DL (ref 28.4–34.8)
MCV RBC AUTO: 97.7 FL (ref 82.6–102.9)
MONOCYTES NFR BLD: 0.9 K/UL (ref 0.1–1.2)
MONOCYTES NFR BLD: 7 % (ref 3–12)
NEUTROPHILS NFR BLD: 75 % (ref 36–65)
NEUTS SEG NFR BLD: 10.22 K/UL (ref 1.5–8.1)
NRBC BLD-RTO: 0 PER 100 WBC
PLATELET # BLD AUTO: 209 K/UL (ref 138–453)
PMV BLD AUTO: 10.5 FL (ref 8.1–13.5)
POTASSIUM SERPL-SCNC: 3.7 MMOL/L (ref 3.7–5.3)
PROT SERPL-MCNC: 6.4 G/DL (ref 6.6–8.7)
RBC # BLD AUTO: 3.49 M/UL (ref 3.95–5.11)
SODIUM SERPL-SCNC: 138 MMOL/L (ref 136–145)
TOTAL PROTEIN, URINE: <4 MG/DL
URINE TOTAL PROTEIN CREATININE RATIO: NORMAL
WBC OTHER # BLD: 13.5 K/UL (ref 3.5–11.3)

## 2024-05-09 PROCEDURE — 80053 COMPREHEN METABOLIC PANEL: CPT

## 2024-05-09 PROCEDURE — 86900 BLOOD TYPING SEROLOGIC ABO: CPT

## 2024-05-09 PROCEDURE — 85025 COMPLETE CBC W/AUTO DIFF WBC: CPT

## 2024-05-09 PROCEDURE — 96372 THER/PROPH/DIAG INJ SC/IM: CPT | Performed by: STUDENT IN AN ORGANIZED HEALTH CARE EDUCATION/TRAINING PROGRAM

## 2024-05-09 PROCEDURE — 80074 ACUTE HEPATITIS PANEL: CPT

## 2024-05-09 PROCEDURE — 96360 HYDRATION IV INFUSION INIT: CPT

## 2024-05-09 PROCEDURE — 36415 COLL VENOUS BLD VENIPUNCTURE: CPT

## 2024-05-09 PROCEDURE — 96361 HYDRATE IV INFUSION ADD-ON: CPT

## 2024-05-09 PROCEDURE — 82570 ASSAY OF URINE CREATININE: CPT

## 2024-05-09 PROCEDURE — 6370000000 HC RX 637 (ALT 250 FOR IP): Performed by: STUDENT IN AN ORGANIZED HEALTH CARE EDUCATION/TRAINING PROGRAM

## 2024-05-09 PROCEDURE — 86901 BLOOD TYPING SEROLOGIC RH(D): CPT

## 2024-05-09 PROCEDURE — 86850 RBC ANTIBODY SCREEN: CPT

## 2024-05-09 PROCEDURE — G0378 HOSPITAL OBSERVATION PER HR: HCPCS

## 2024-05-09 PROCEDURE — 2580000003 HC RX 258: Performed by: STUDENT IN AN ORGANIZED HEALTH CARE EDUCATION/TRAINING PROGRAM

## 2024-05-09 PROCEDURE — 0502F SUBSEQUENT PRENATAL CARE: CPT | Performed by: STUDENT IN AN ORGANIZED HEALTH CARE EDUCATION/TRAINING PROGRAM

## 2024-05-09 PROCEDURE — 84156 ASSAY OF PROTEIN URINE: CPT

## 2024-05-09 RX ORDER — CALCIUM CARBONATE 500 MG/1
500 TABLET, CHEWABLE ORAL 3 TIMES DAILY PRN
Status: DISCONTINUED | OUTPATIENT
Start: 2024-05-09 | End: 2024-05-10 | Stop reason: HOSPADM

## 2024-05-09 RX ORDER — ACETAMINOPHEN 500 MG
1000 TABLET ORAL EVERY 8 HOURS SCHEDULED
Status: DISCONTINUED | OUTPATIENT
Start: 2024-05-09 | End: 2024-05-09

## 2024-05-09 RX ORDER — VITAMIN A, ASCORBIC ACID, CHOLECALCIFEROL, .ALPHA.-TOCOPHEROL ACETATE, DL-, THIAMINE MONONITRATE, RIBOFLAVIN, NIACINAMIDE, PYRIDOXINE HYDROCHLORIDE, FOLIC ACID, CYANOCOBALAMIN, CALCIUM CARBONATE, IRON, ZINC OXIDE, AND CUPRIC OXIDE 4000; 120; 400; 22; 1.84; 3; 20; 10; 1; 12; 200; 29; 25; 2 [IU]/1; MG/1; [IU]/1; [IU]/1; MG/1; MG/1; MG/1; MG/1; MG/1; UG/1; MG/1; MG/1; MG/1; MG/1
1 TABLET ORAL DAILY
Status: DISCONTINUED | OUTPATIENT
Start: 2024-05-09 | End: 2024-05-10 | Stop reason: HOSPADM

## 2024-05-09 RX ORDER — ACETAMINOPHEN 500 MG
1000 TABLET ORAL EVERY 6 HOURS PRN
Status: DISCONTINUED | OUTPATIENT
Start: 2024-05-09 | End: 2024-05-10 | Stop reason: HOSPADM

## 2024-05-09 RX ORDER — ASPIRIN 81 MG/1
81 TABLET, CHEWABLE ORAL DAILY
Status: DISCONTINUED | OUTPATIENT
Start: 2024-05-09 | End: 2024-05-10 | Stop reason: HOSPADM

## 2024-05-09 RX ORDER — SODIUM CHLORIDE, SODIUM LACTATE, POTASSIUM CHLORIDE, CALCIUM CHLORIDE 600; 310; 30; 20 MG/100ML; MG/100ML; MG/100ML; MG/100ML
INJECTION, SOLUTION INTRAVENOUS CONTINUOUS
Status: DISCONTINUED | OUTPATIENT
Start: 2024-05-09 | End: 2024-05-09

## 2024-05-09 RX ORDER — SODIUM CHLORIDE, SODIUM LACTATE, POTASSIUM CHLORIDE, AND CALCIUM CHLORIDE .6; .31; .03; .02 G/100ML; G/100ML; G/100ML; G/100ML
1000 INJECTION, SOLUTION INTRAVENOUS ONCE
Status: COMPLETED | OUTPATIENT
Start: 2024-05-09 | End: 2024-05-09

## 2024-05-09 RX ADMIN — ASPIRIN 81 MG 81 MG: 81 TABLET ORAL at 13:10

## 2024-05-09 RX ADMIN — SODIUM CHLORIDE, POTASSIUM CHLORIDE, SODIUM LACTATE AND CALCIUM CHLORIDE 1000 ML: 600; 310; 30; 20 INJECTION, SOLUTION INTRAVENOUS at 10:20

## 2024-05-09 RX ADMIN — ACETAMINOPHEN 1000 MG: 500 TABLET ORAL at 10:23

## 2024-05-09 RX ADMIN — SODIUM CHLORIDE, POTASSIUM CHLORIDE, SODIUM LACTATE AND CALCIUM CHLORIDE: 600; 310; 30; 20 INJECTION, SOLUTION INTRAVENOUS at 13:10

## 2024-05-09 ASSESSMENT — PAIN SCALES - GENERAL: PAINLEVEL_OUTOF10: 2

## 2024-05-09 ASSESSMENT — PAIN DESCRIPTION - DESCRIPTORS: DESCRIPTORS: ACHING

## 2024-05-09 ASSESSMENT — PAIN DESCRIPTION - LOCATION: LOCATION: HEAD

## 2024-05-09 NOTE — CARE COORDINATION
[facility] ANTEPARTUM OBSERVATION CARE COORDINATION NOTE    Admission:  High risk pregnancy in second trimester due to recurrent pregnancy loss [O26.22]    This CM met with Deyanira and her  Issa at her bedside to inform of Case Management role and discuss discharge needs.     She was instructed to come to L&D for further workup and close observation due to having an elevated BP and a mild headache at her OB appointment @ 28w5d. Subsequent lab work obtained upon arrival to hospital revealed elevated liver enzymes ALT: 67 and AST: 39. She will stay overnight with IVF, Hepatitis panel, RUQ U/S and repeat labs tomorrow.    OB: Dr. Sergey Segura.     CM verified address and phone number correct on facesheet. She denied barriers w/ transportation home, to doctor appointments or paying for medications at time of DC    [employer] Correct    No current HC/DME    REQUESTED DME/HC/MEDICATION NEEDS AT TIME OF DC: None, however, she may need a BP cuff at time of DC.    CM continue to follow for any medically related discharge needs.

## 2024-05-09 NOTE — PROGRESS NOTES
Prenatal Visit    Deyanira Marino is a 28 y.o. female  at 28w5d [facility]    The patient was seen and evaluated. She is here today w/ her partner. She reports that she does not feel good. She is complaining that she woke up with a headache. She has not tried anything for the headache as she prefers to avoid medications in pregnancy. She also just does not feel like herself. She had a dizzy episode yesterday that lasted a few minutes while at work. Her apple watch noted that her heart rate reached 150s yesterday. Today she is feeling like that dizzy spell is returning. She saw spots in her vision yesterday too. Those have thankfully resolved today.  She is very anxious that she is feeling this way. She reports positive fetal movements. She denies contractions, vaginal bleeding and leakage of fluid. Denies having any signs of  labor.     The problem list reflects the active issues addressed during today's visit    Vitals:  BP: 132/88  Weight - Scale: 67 kg (147 lb 9.6 oz)  Pulse: (!) 128  Fundal Height (cm): 28 cm  Fetal HR: 148  Movement: Present     PHYSICAL:   General appearance: no apparent distress, alert and cooperative  HEENT: head atraumatic, normocephalic, trachea midline, moist mucous membranes   Neurologic: alert, oriented, normal speech   Lungs: no increased work of breathing,   Abdomen: soft, gravid, non-tender on palpation    Musculoskeletal: no gross abnormalities, range of motion appropriate for age   Psychiatric: mood appropriate, normal affect      Assessment & Plan:  Deyanira Marino is a 28 y.o. female  at 28w5d   - VSS    - Initial BP was elevated at 142/89 but repeat was normotensive    - Initial heart rate elevated at 143 and improved to 128 at end of appointment    - Due to abnormal vitals with complaint of dizzy spells, vision changes and headache, advise that patient be evaluated in OB triage for preeclampsia labs and cycling of blood pressures. OB RN notified of her upcoming

## 2024-05-09 NOTE — PROGRESS NOTES
Ob/Gyn Progress Note    Pre E labs resulted and liver enzymes elevated. ALT: 67 and AST: 39. Will admit overnight and repeat pre E labs in the morning. Will order hepatitis panel and RUQ ultrasound to rule out any other etiologies of elevated liver enzymes.     Dr. Schmid updated and in agreement with plan.    Tisha Oconnor DO, PGY-4  Ob/Gyn Resident  [facility]  05/09/24

## 2024-05-09 NOTE — H&P
that she has never smoked. She has never used smokeless tobacco. She reports that she does not currently use alcohol. She reports that she does not use drugs.    VITALS:  Vitals:    05/09/24 0939   BP: 116/71   Pulse: (!) 115   Resp: 20   Temp: 98.3 °F (36.8 °C)   SpO2: 97%         PHYSICAL EXAM:  Fetal Heart Monitor:  Baseline Heart Rate 150, moderate variability, present accelerations, absent decelerations  [place]: contractions, irritability    General appearance:  no apparent distress alert and cooperative  HEENT: head atraumatic, normocephalic, moist mucous membranes, trachea midline  Neurologic:  alert, oriented, normal speech, no focal findings or movement disorder noted  Lungs:  No increased work of breathing, good air exchange, clear to auscultation bilaterally, no crackles or wheezing  Heart:  regular rate and rhythm and no murmur, rubs, gallops  Abdomen:  soft, gravid, non-tender, no rebound, guarding, or rigidity, no RUQ or epigastric tenderness, no signs or symptoms of abruption, no signs or symptoms of chorioamnionitis  Extremities:  no calf tenderness, non edematous, no varicosities, full range of motion in all four extremities  Musculoskeletal: Gross strength equal and intact throughout, no gross abnormalities, range of motion normal in hips, knees, shoulders and spine  Psychiatric: Mood appropriate, normal affect   Rectal Exam: not indicated    PRENATAL LAB RESULTS:  Blood Type/Rh: A pos  Antibody Screen: negative  Hemoglobin, Hematocrit, Platelets: 14.6/41.5/247  Rubella: immune  T. Pallidum, IgG: non-reactive  Hepatitis B Surface Antigen: non-reactive   Hepatitis C Antibody: not done   HIV: negative   Gonorrhea: negative  Chlamydia: negative  Urine culture: negative, date: 12/12/23, 2/3/24  Positive, 10-50,000 CFU/ML 3/28/24    Early 1 hour Glucose Tolerance Test:  119  Early 3 hour Glucose Tolerance Test: Fasting /1 hour /2 hour /3 hour   1 hour Glucose Tolerance Test:  119  3 hour Glucose Tolerance

## 2024-05-10 ENCOUNTER — TELEPHONE (OUTPATIENT)
Dept: OBGYN CLINIC | Age: 29
End: 2024-05-10

## 2024-05-10 ENCOUNTER — APPOINTMENT (OUTPATIENT)
Dept: ULTRASOUND IMAGING | Age: 29
End: 2024-05-10
Payer: COMMERCIAL

## 2024-05-10 VITALS
HEART RATE: 85 BPM | DIASTOLIC BLOOD PRESSURE: 71 MMHG | TEMPERATURE: 98.1 F | RESPIRATION RATE: 16 BRPM | SYSTOLIC BLOOD PRESSURE: 108 MMHG | OXYGEN SATURATION: 98 %

## 2024-05-10 DIAGNOSIS — R79.89 ELEVATED LFTS: ICD-10-CM

## 2024-05-10 DIAGNOSIS — Z3A.28 28 WEEKS GESTATION OF PREGNANCY: ICD-10-CM

## 2024-05-10 DIAGNOSIS — O09.93 HIGH-RISK PREGNANCY IN THIRD TRIMESTER: Primary | ICD-10-CM

## 2024-05-10 PROBLEM — O16.3 HYPERTENSION DURING PREGNANCY IN THIRD TRIMESTER: Status: ACTIVE | Noted: 2024-05-10

## 2024-05-10 LAB
ALBUMIN SERPL-MCNC: 3.6 G/DL (ref 3.5–5.2)
ALBUMIN/GLOB SERPL: 1 {RATIO} (ref 1–2.5)
ALP SERPL-CCNC: 90 U/L (ref 35–104)
ALT SERPL-CCNC: 53 U/L (ref 10–35)
ANION GAP SERPL CALCULATED.3IONS-SCNC: 9 MMOL/L (ref 9–16)
AST SERPL-CCNC: 30 U/L (ref 10–35)
BASOPHILS # BLD: 0.08 K/UL (ref 0–0.2)
BASOPHILS NFR BLD: 1 % (ref 0–2)
BILIRUB SERPL-MCNC: 0.2 MG/DL (ref 0–1.2)
BUN SERPL-MCNC: 10 MG/DL (ref 6–20)
CALCIUM SERPL-MCNC: 8.5 MG/DL (ref 8.6–10.4)
CHLORIDE SERPL-SCNC: 105 MMOL/L (ref 98–107)
CO2 SERPL-SCNC: 23 MMOL/L (ref 20–31)
CREAT SERPL-MCNC: 0.7 MG/DL (ref 0.5–0.9)
EOSINOPHIL # BLD: 0.4 K/UL (ref 0–0.44)
EOSINOPHILS RELATIVE PERCENT: 3 % (ref 1–4)
ERYTHROCYTE [DISTWIDTH] IN BLOOD BY AUTOMATED COUNT: 12.3 % (ref 11.8–14.4)
GFR, ESTIMATED: >90 ML/MIN/1.73M2
GLUCOSE SERPL-MCNC: 79 MG/DL (ref 74–99)
HCT VFR BLD AUTO: 36.1 % (ref 36.3–47.1)
HGB BLD-MCNC: 12 G/DL (ref 11.9–15.1)
IMM GRANULOCYTES # BLD AUTO: 0.3 K/UL (ref 0–0.3)
IMM GRANULOCYTES NFR BLD: 2 %
LYMPHOCYTES NFR BLD: 2.5 K/UL (ref 1.1–3.7)
LYMPHOCYTES RELATIVE PERCENT: 17 % (ref 24–43)
MCH RBC QN AUTO: 33.9 PG (ref 25.2–33.5)
MCHC RBC AUTO-ENTMCNC: 33.2 G/DL (ref 28.4–34.8)
MCV RBC AUTO: 102 FL (ref 82.6–102.9)
MONOCYTES NFR BLD: 0.85 K/UL (ref 0.1–1.2)
MONOCYTES NFR BLD: 6 % (ref 3–12)
NEUTROPHILS NFR BLD: 71 % (ref 36–65)
NEUTS SEG NFR BLD: 10.42 K/UL (ref 1.5–8.1)
NRBC BLD-RTO: 0 PER 100 WBC
PLATELET # BLD AUTO: 191 K/UL (ref 138–453)
PMV BLD AUTO: 10.4 FL (ref 8.1–13.5)
POTASSIUM SERPL-SCNC: 3.7 MMOL/L (ref 3.7–5.3)
PROT SERPL-MCNC: 6.2 G/DL (ref 6.6–8.7)
RBC # BLD AUTO: 3.54 M/UL (ref 3.95–5.11)
SODIUM SERPL-SCNC: 137 MMOL/L (ref 136–145)
WBC OTHER # BLD: 14.6 K/UL (ref 3.5–11.3)

## 2024-05-10 PROCEDURE — 36415 COLL VENOUS BLD VENIPUNCTURE: CPT

## 2024-05-10 PROCEDURE — 85025 COMPLETE CBC W/AUTO DIFF WBC: CPT

## 2024-05-10 PROCEDURE — 80053 COMPREHEN METABOLIC PANEL: CPT

## 2024-05-10 PROCEDURE — 76705 ECHO EXAM OF ABDOMEN: CPT

## 2024-05-10 PROCEDURE — G0378 HOSPITAL OBSERVATION PER HR: HCPCS

## 2024-05-10 PROCEDURE — 99213 OFFICE O/P EST LOW 20 MIN: CPT

## 2024-05-10 PROCEDURE — 6370000000 HC RX 637 (ALT 250 FOR IP)

## 2024-05-10 PROCEDURE — 99222 1ST HOSP IP/OBS MODERATE 55: CPT | Performed by: OBSTETRICS & GYNECOLOGY

## 2024-05-10 RX ADMIN — ANTACID TABLETS 500 MG: 500 TABLET, CHEWABLE ORAL at 00:01

## 2024-05-10 NOTE — TELEPHONE ENCOUNTER
28.6 wks    Pt calling in asking if she needs and appt sooner or if she needs any labs done as Dr. Rincon sent her to the ER yesterday and they just discharged her this morning and was jsut wanting to know next steps.     Pls advise.
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
## 2024-05-10 NOTE — FLOWSHEET NOTE
Pt discharged home. Pt verbalized understanding of discharge instructions. Pt told to stop aspirin by Dr. Tobar ambulatory home.

## 2024-05-10 NOTE — DISCHARGE INSTRUCTIONS
.Notify physician if you experience: Dizziness or severe headache  Spots before eyes or blurred vision    Chills and or fever  Vaginal pressure  Epigastric pain  Uterine contractions are regular and 5 minutes apart if 1st baby or 10 minutes apart if not 1st baby  Bag or dougherty leaking or gush of fluid from vagina  Any vaginal bleeding that is heavier than a menstrual period  Intermittent low backache  Vomiting or diarrhea for several hours  Decrease or absence of baby movement  Fetal movement card instructions given  Right sided abdominal pain  Increase or change in vaginal discharge  Abdominal or menstrual like cramping that is constant or heavier, comes and goes  Swelling of face, hands, legs or feet not decreased with rest on left side.

## 2024-05-10 NOTE — PROGRESS NOTES
Ob/Gyn Progress Notes    Patients Pre E labs have resulted and ALT and AST have down trended to 53/30. Patients blood pressures have remained stable and patient has not met criteria for any hypertensive disorder in pregnancy at this time. Patient is asymptomatic and stable for discharge home.     No evidence of uterine rupture or placental abruption. History and physical exam support musculoskeletal etiology. Discussed supportive management including rest, heat pack, ice, Tylenol. Patient denies any headache, visual changes, difficulty breathing, RUQ pain, N/V, F/C, and pain/swelling in lower extremities. Denies any dysuria or vaginal discharge. Patient may be discharged to home. Patient counseled on  labor precautions, preE precautions, adequate PO hydration, and fetal kick counts. All questions answered and concerns addressed. Patient vocalized understanding.      Tisha Oconnor DO, PGY-4  Ob/Gyn Resident  [facility]  05/10/24

## 2024-05-13 ENCOUNTER — TELEPHONE (OUTPATIENT)
Dept: OBGYN | Age: 29
End: 2024-05-13

## 2024-05-14 ENCOUNTER — HOSPITAL ENCOUNTER (OUTPATIENT)
Age: 29
Setting detail: SPECIMEN
Discharge: HOME | End: 2024-05-14

## 2024-05-14 ENCOUNTER — HOSPITAL ENCOUNTER (OUTPATIENT)
Age: 29
Discharge: HOME | End: 2024-05-14
Attending: OBSTETRICS & GYNECOLOGY | Admitting: OBSTETRICS & GYNECOLOGY
Payer: COMMERCIAL

## 2024-05-14 VITALS
DIASTOLIC BLOOD PRESSURE: 69 MMHG | SYSTOLIC BLOOD PRESSURE: 104 MMHG | TEMPERATURE: 98.1 F | HEART RATE: 87 BPM | RESPIRATION RATE: 16 BRPM | OXYGEN SATURATION: 99 %

## 2024-05-14 DIAGNOSIS — R79.89 ELEVATED LFTS: ICD-10-CM

## 2024-05-14 DIAGNOSIS — Z3A.28 28 WEEKS GESTATION OF PREGNANCY: ICD-10-CM

## 2024-05-14 DIAGNOSIS — O09.93 HIGH-RISK PREGNANCY IN THIRD TRIMESTER: ICD-10-CM

## 2024-05-14 PROBLEM — Z3A.29 29 WEEKS GESTATION OF PREGNANCY: Status: ACTIVE | Noted: 2024-05-14

## 2024-05-14 PROBLEM — O16.3 HYPERTENSION DURING PREGNANCY IN THIRD TRIMESTER: Status: RESOLVED | Noted: 2024-05-10 | Resolved: 2024-05-14

## 2024-05-14 LAB
ALBUMIN SERPL-MCNC: 3.7 G/DL (ref 3.5–5.2)
ALBUMIN SERPL-MCNC: 3.8 G/DL (ref 3.5–5.2)
ALBUMIN/GLOB SERPL: 1 {RATIO} (ref 1–2.5)
ALBUMIN/GLOB SERPL: 1 {RATIO} (ref 1–2.5)
ALP SERPL-CCNC: 103 U/L (ref 35–104)
ALP SERPL-CCNC: 94 U/L (ref 35–104)
ALT SERPL-CCNC: 48 U/L (ref 10–35)
ALT SERPL-CCNC: 50 U/L (ref 10–35)
ANION GAP SERPL CALCULATED.3IONS-SCNC: 11 MMOL/L (ref 9–16)
ANION GAP SERPL CALCULATED.3IONS-SCNC: 9 MMOL/L (ref 9–16)
AST SERPL-CCNC: 29 U/L (ref 10–35)
AST SERPL-CCNC: 31 U/L (ref 10–35)
BASOPHILS # BLD: 0.05 K/UL (ref 0–0.2)
BASOPHILS NFR BLD: 0 % (ref 0–2)
BILIRUB SERPL-MCNC: 0.2 MG/DL (ref 0–1.2)
BILIRUB SERPL-MCNC: 0.2 MG/DL (ref 0–1.2)
BUN SERPL-MCNC: 9 MG/DL (ref 6–20)
BUN SERPL-MCNC: 9 MG/DL (ref 6–20)
CALCIUM SERPL-MCNC: 9.2 MG/DL (ref 8.6–10.4)
CALCIUM SERPL-MCNC: 9.2 MG/DL (ref 8.6–10.4)
CHLORIDE SERPL-SCNC: 102 MMOL/L (ref 98–107)
CHLORIDE SERPL-SCNC: 103 MMOL/L (ref 98–107)
CO2 SERPL-SCNC: 26 MMOL/L (ref 20–31)
CO2 SERPL-SCNC: 26 MMOL/L (ref 20–31)
CREAT SERPL-MCNC: 0.6 MG/DL (ref 0.5–0.9)
CREAT SERPL-MCNC: 0.7 MG/DL (ref 0.5–0.9)
CREAT UR-MCNC: 22.6 MG/DL (ref 28–217)
EOSINOPHIL # BLD: 0.15 K/UL (ref 0–0.44)
EOSINOPHILS RELATIVE PERCENT: 1 % (ref 1–4)
ERYTHROCYTE [DISTWIDTH] IN BLOOD BY AUTOMATED COUNT: 12.1 % (ref 11.8–14.4)
GFR, ESTIMATED: >90 ML/MIN/1.73M2
GFR, ESTIMATED: >90 ML/MIN/1.73M2
GLUCOSE SERPL-MCNC: 143 MG/DL (ref 74–99)
GLUCOSE SERPL-MCNC: 89 MG/DL (ref 74–99)
HCT VFR BLD AUTO: 35.1 % (ref 36.3–47.1)
HGB BLD-MCNC: 12.1 G/DL (ref 11.9–15.1)
IMM GRANULOCYTES # BLD AUTO: 0.3 K/UL (ref 0–0.3)
IMM GRANULOCYTES NFR BLD: 3 %
LYMPHOCYTES NFR BLD: 1.81 K/UL (ref 1.1–3.7)
LYMPHOCYTES RELATIVE PERCENT: 15 % (ref 24–43)
MCH RBC QN AUTO: 33.9 PG (ref 25.2–33.5)
MCHC RBC AUTO-ENTMCNC: 34.5 G/DL (ref 28.4–34.8)
MCV RBC AUTO: 98.3 FL (ref 82.6–102.9)
MONOCYTES NFR BLD: 0.68 K/UL (ref 0.1–1.2)
MONOCYTES NFR BLD: 6 % (ref 3–12)
NEUTROPHILS NFR BLD: 75 % (ref 36–65)
NEUTS SEG NFR BLD: 8.97 K/UL (ref 1.5–8.1)
NRBC BLD-RTO: 0 PER 100 WBC
PLATELET # BLD AUTO: 186 K/UL (ref 138–453)
PMV BLD AUTO: 10.1 FL (ref 8.1–13.5)
POTASSIUM SERPL-SCNC: 3.7 MMOL/L (ref 3.7–5.3)
POTASSIUM SERPL-SCNC: 4 MMOL/L (ref 3.7–5.3)
PROT SERPL-MCNC: 6.4 G/DL (ref 6.6–8.7)
PROT SERPL-MCNC: 6.4 G/DL (ref 6.6–8.7)
RBC # BLD AUTO: 3.57 M/UL (ref 3.95–5.11)
SODIUM SERPL-SCNC: 138 MMOL/L (ref 136–145)
SODIUM SERPL-SCNC: 139 MMOL/L (ref 136–145)
TOTAL PROTEIN, URINE: <4 MG/DL
URINE TOTAL PROTEIN CREATININE RATIO: ABNORMAL
WBC OTHER # BLD: 12 K/UL (ref 3.5–11.3)

## 2024-05-14 PROCEDURE — 99213 OFFICE O/P EST LOW 20 MIN: CPT

## 2024-05-14 PROCEDURE — 84156 ASSAY OF PROTEIN URINE: CPT

## 2024-05-14 PROCEDURE — 80053 COMPREHEN METABOLIC PANEL: CPT

## 2024-05-14 PROCEDURE — G0378 HOSPITAL OBSERVATION PER HR: HCPCS

## 2024-05-14 PROCEDURE — 85025 COMPLETE CBC W/AUTO DIFF WBC: CPT

## 2024-05-14 PROCEDURE — 82570 ASSAY OF URINE CREATININE: CPT

## 2024-05-14 PROCEDURE — G0379 DIRECT REFER HOSPITAL OBSERV: HCPCS

## 2024-05-14 PROCEDURE — 93005 ELECTROCARDIOGRAM TRACING: CPT

## 2024-05-14 PROCEDURE — 6370000000 HC RX 637 (ALT 250 FOR IP)

## 2024-05-14 RX ORDER — ACETAMINOPHEN 500 MG
1000 TABLET ORAL EVERY 8 HOURS SCHEDULED
Status: DISCONTINUED | OUTPATIENT
Start: 2024-05-14 | End: 2024-05-14

## 2024-05-14 RX ORDER — ACETAMINOPHEN 500 MG
1000 TABLET ORAL EVERY 8 HOURS PRN
Status: DISCONTINUED | OUTPATIENT
Start: 2024-05-14 | End: 2024-05-14 | Stop reason: HOSPADM

## 2024-05-14 RX ORDER — LANOLIN ALCOHOL/MO/W.PET/CERES
400 CREAM (GRAM) TOPICAL 2 TIMES DAILY PRN
Status: DISCONTINUED | OUTPATIENT
Start: 2024-05-14 | End: 2024-05-14 | Stop reason: HOSPADM

## 2024-05-14 RX ADMIN — ACETAMINOPHEN 1000 MG: 500 TABLET ORAL at 12:49

## 2024-05-14 ASSESSMENT — PAIN SCALES - GENERAL: PAINLEVEL_OUTOF10: 3

## 2024-05-14 NOTE — DISCHARGE INSTRUCTIONS
Notify Physician for:       *  Regular contractions that are  5 minutes apart or less lasting longer than 1 hr for 1st baby, 10 mins apart or less if 2nd baby or greater.     *  Leaking or gush of fluid from vagina.     *  Any vaginal bleeding that is heavier than a menstrual period.     *  Decrease or absence of baby movement.     *  Vaginal pressure, low backache.     *  Vomiting or diarrhea for several hours, and unable to take in/ keep fluids or food down.     *  Increase or change in vaginal discharge.     *  Abdominal or menstrual- like cramping that is constant or intermittent.     *  Fever and/ or chills.                    *  Headache              *  Blurred and or visual changes-  (spots before eyes, \"floaters\")              *  Upper abdominal/ epigastric pain  Activities:       Activity as tolerated                      Diet:          Diet as tolerated    Drink 10- 12 glasses of water daily.  Follow up with Dr. Pappas as scheduled      Be sure to keep next scheduled appt, and call your DrAlphonse With any questions

## 2024-05-14 NOTE — FLOWSHEET NOTE
Pt arrived to unit with c/o rib pain and headache. Also states this morning her heart rate felt very irregular and like it was racing. Pt placed in 707, EFM placed and vitals taken. Residents notified of pt arrival.
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
## 2024-05-14 NOTE — PROGRESS NOTES
Obstetric/Gynecology Resident Interval Note    Patient reevaluated at bedside. She has received Tylenol 1g and her HA has resolved along with relief of the majority of her rib pain after her baby shifted over in her abdomen. She feels better and has not noted any palpitations.    Labs have resulted. PreE labs wnl (with the exception of elevated ALT), P/C TLTC.    Her ALT has been elevated over the past week, but has decreased. Hepatitis panel negative from 5/9/24. See prior documentation regarding elevated LFT's. She has been completely asx from a PreE standpoint and her blood pressures have been normotensive.     Low clinical suspicion for PreE due to normotensive BP and overall normal labs. FHT has been reassuring. Encouraged routine follow up with her OB.     Patient may be discharged to home. Patient counseled on adequate PO hydration and fetal kick counts. All questions and concerns addressed. Patient is aware that she should return to the hospital if she has worsening contractions, LOF, VB or decreased fetal movements. She voices understanding. Patient is aware that she should return to hospital if she experiences unrelenting headache, vision changes, RUQ pain, nausea, vomiting, and peripheral edema. She voices understanding.    Vitals:    05/14/24 1143 05/14/24 1435   BP: 120/77 104/69   Pulse: 92 87   Resp: 17 16   Temp: 98.1 °F (36.7 °C)    TempSrc: Oral    SpO2: 99%            Sabrina Agrawal MD  OB/GYN Resident, PGY1  [place], [place]  5/14/2024, 3:05 PM

## 2024-05-14 NOTE — TELEPHONE ENCOUNTER
OB/GYN Physician Telephone Encounter    Patient has paged a few times this evening with complaints of a headache and right ribcage pain. After taking some Tylenol her rib cage pain has resolved and her headache has very much improved. She reports adequate hydration today. Feels fetal movement, denies leaking, denies bleeding and denies any abdominal pain. She will sometimes take TUMS for GERD and denies any of those symptoms. She took her BP with her friends cuff and was slightly alarmed that her pressure went from 117/80 to 120/86.     Reassured patient that her BP is in normotensive range. Reviewed elevated pressures and severe range pressures.     Advised she can try some benadryl if her headache continues to bother her.     Advise she repeat her CMP that was previously ordered within the next 1-3 days. Her next appt is 5/24/24/     Also discussed that if she would feel more comfortable being evaluated on L&D overnight she can absolutely be seen in triage.     Patient seemed reassured knowing that her BP were normal and that her HA/RUQ pain improved with just tylenol.     Will follow outpatient labs once she gets them completed.       Farida Rincon,     [facility]  5/13/2024, 10:41 PM

## 2024-05-15 LAB
EKG ATRIAL RATE: 93 BPM
EKG P AXIS: 63 DEGREES
EKG P-R INTERVAL: 140 MS
EKG Q-T INTERVAL: 332 MS
EKG QRS DURATION: 76 MS
EKG QTC CALCULATION (BAZETT): 412 MS
EKG R AXIS: 72 DEGREES
EKG T AXIS: 31 DEGREES
EKG VENTRICULAR RATE: 93 BPM

## 2024-05-15 PROCEDURE — 93010 ELECTROCARDIOGRAM REPORT: CPT | Performed by: INTERNAL MEDICINE

## 2024-05-20 DIAGNOSIS — O09.93 HIGH-RISK PREGNANCY IN THIRD TRIMESTER: ICD-10-CM

## 2024-05-20 DIAGNOSIS — Z3A.30 30 WEEKS GESTATION OF PREGNANCY: Primary | ICD-10-CM

## 2024-05-21 ENCOUNTER — HOSPITAL ENCOUNTER (OUTPATIENT)
Age: 29
Discharge: HOME | End: 2024-05-22
Attending: OBSTETRICS & GYNECOLOGY | Admitting: OBSTETRICS & GYNECOLOGY
Payer: COMMERCIAL

## 2024-05-21 ENCOUNTER — HOSPITAL ENCOUNTER (OUTPATIENT)
Age: 29
Setting detail: SPECIMEN
Discharge: HOME | End: 2024-05-21

## 2024-05-21 VITALS
TEMPERATURE: 98.6 F | DIASTOLIC BLOOD PRESSURE: 74 MMHG | SYSTOLIC BLOOD PRESSURE: 111 MMHG | RESPIRATION RATE: 16 BRPM | OXYGEN SATURATION: 100 % | HEART RATE: 106 BPM

## 2024-05-21 DIAGNOSIS — Z3A.30 30 WEEKS GESTATION OF PREGNANCY: ICD-10-CM

## 2024-05-21 DIAGNOSIS — O09.93 HIGH-RISK PREGNANCY IN THIRD TRIMESTER: ICD-10-CM

## 2024-05-21 PROBLEM — Z3A.29 29 WEEKS GESTATION OF PREGNANCY: Status: RESOLVED | Noted: 2024-05-14 | Resolved: 2024-05-21

## 2024-05-21 LAB
ALBUMIN SERPL-MCNC: 3.5 G/DL (ref 3.5–5.2)
ALBUMIN/GLOB SERPL: 1 {RATIO} (ref 1–2.5)
ALP SERPL-CCNC: 106 U/L (ref 35–104)
ALT SERPL-CCNC: 46 U/L (ref 10–35)
ANION GAP SERPL CALCULATED.3IONS-SCNC: 9 MMOL/L (ref 9–16)
AST SERPL-CCNC: 27 U/L (ref 10–35)
BACTERIA URNS QL MICRO: ABNORMAL
BILIRUB SERPL-MCNC: <0.2 MG/DL (ref 0–1.2)
BILIRUB UR QL STRIP: NEGATIVE
BUN SERPL-MCNC: 6 MG/DL (ref 6–20)
CALCIUM SERPL-MCNC: 8.8 MG/DL (ref 8.6–10.4)
CASTS #/AREA URNS LPF: ABNORMAL /LPF (ref 0–8)
CHLORIDE SERPL-SCNC: 104 MMOL/L (ref 98–107)
CLARITY UR: CLEAR
CO2 SERPL-SCNC: 24 MMOL/L (ref 20–31)
COLOR UR: YELLOW
CREAT SERPL-MCNC: 0.5 MG/DL (ref 0.5–0.9)
EPI CELLS #/AREA URNS HPF: ABNORMAL /HPF (ref 0–5)
GFR, ESTIMATED: >90 ML/MIN/1.73M2
GLUCOSE SERPL-MCNC: 80 MG/DL (ref 74–99)
GLUCOSE UR STRIP-MCNC: NEGATIVE MG/DL
HGB UR QL STRIP.AUTO: NEGATIVE
KETONES UR STRIP-MCNC: NEGATIVE MG/DL
LEUKOCYTE ESTERASE UR QL STRIP: ABNORMAL
NITRITE UR QL STRIP: NEGATIVE
PH UR STRIP: 7 [PH] (ref 5–8)
POTASSIUM SERPL-SCNC: 3.7 MMOL/L (ref 3.7–5.3)
PROT SERPL-MCNC: 6.2 G/DL (ref 6.6–8.7)
PROT UR STRIP-MCNC: NEGATIVE MG/DL
RBC #/AREA URNS HPF: ABNORMAL /HPF (ref 0–4)
SODIUM SERPL-SCNC: 137 MMOL/L (ref 136–145)
SP GR UR STRIP: 1.01 (ref 1–1.03)
UROBILINOGEN UR STRIP-ACNC: NORMAL EU/DL (ref 0–1)
WBC #/AREA URNS HPF: ABNORMAL /HPF (ref 0–5)

## 2024-05-21 PROCEDURE — 87491 CHLMYD TRACH DNA AMP PROBE: CPT

## 2024-05-21 PROCEDURE — 86403 PARTICLE AGGLUT ANTBDY SCRN: CPT

## 2024-05-21 PROCEDURE — 87660 TRICHOMONAS VAGIN DIR PROBE: CPT

## 2024-05-21 PROCEDURE — 87480 CANDIDA DNA DIR PROBE: CPT

## 2024-05-21 PROCEDURE — 87086 URINE CULTURE/COLONY COUNT: CPT

## 2024-05-21 PROCEDURE — 87591 N.GONORRHOEAE DNA AMP PROB: CPT

## 2024-05-21 PROCEDURE — 87510 GARDNER VAG DNA DIR PROBE: CPT

## 2024-05-21 PROCEDURE — 81001 URINALYSIS AUTO W/SCOPE: CPT

## 2024-05-21 RX ORDER — ONDANSETRON 4 MG/1
4 TABLET, ORALLY DISINTEGRATING ORAL EVERY 8 HOURS PRN
Status: DISCONTINUED | OUTPATIENT
Start: 2024-05-21 | End: 2024-05-22 | Stop reason: HOSPADM

## 2024-05-21 RX ORDER — ONDANSETRON 2 MG/ML
4 INJECTION INTRAMUSCULAR; INTRAVENOUS EVERY 6 HOURS PRN
Status: DISCONTINUED | OUTPATIENT
Start: 2024-05-21 | End: 2024-05-22 | Stop reason: HOSPADM

## 2024-05-21 RX ORDER — ACETAMINOPHEN 500 MG
1000 TABLET ORAL EVERY 6 HOURS PRN
Status: DISCONTINUED | OUTPATIENT
Start: 2024-05-21 | End: 2024-05-22 | Stop reason: HOSPADM

## 2024-05-22 LAB
C TRACH DNA SPEC QL PROBE+SIG AMP: NEGATIVE
CANDIDA SPECIES: NEGATIVE
GARDNERELLA VAGINALIS: NEGATIVE
MICROORGANISM SPEC CULT: ABNORMAL
N GONORRHOEA DNA SPEC QL PROBE+SIG AMP: NEGATIVE
SOURCE: NORMAL
SPECIMEN DESCRIPTION: ABNORMAL
SPECIMEN DESCRIPTION: NORMAL
TRICHOMONAS: NEGATIVE

## 2024-05-22 NOTE — FLOWSHEET NOTE
Patient given discharge instructions per order, signs and states understanding. Copies given. Patient to follow-up as needed with Dr. Kasper.  EFM removed. Patient ambulates to [facility] for discharge with FOB.

## 2024-05-22 NOTE — DISCHARGE INSTRUCTIONS
Notify Physician for:       *  Regular contractions that are  5 minutes apart or less lasting longer than 1 hr for 1st baby, 10 mins apart or less if 2nd baby or greater.     *  Leaking or gush of fluid from vagina.     *  Any vaginal bleeding that is heavier than a menstrual period.     *  Decrease or absence of baby movement.     *  Vaginal pressure, low backache.     *  Vomiting or diarrhea for several hours, and unable to take in/ keep fluids or food down.     *  Increase or change in vaginal discharge.     *  Abdominal or menstrual- like cramping that is constant or intermittent.     *  Fever and/ or chills.                    *  Headache              *  Blurred and or visual changes-  (spots before eyes, \"floaters\")              *  Upper abdominal/ epigastric pain  Activities:       As tolerated.                      Diet:          Well-balanced.    Drink 10- 12 glasses of water daily.    Be sure to keep next scheduled appt, and call your Dr. With any questions

## 2024-05-22 NOTE — FLOWSHEET NOTE
Patient arrives to L&D via ambulatory.  Patient states she noticed ctx around 1630 that have been 10-15 minutes apart.  She states she has also noticed some leakage of fluids.  She denies bleeding or pain.  She reports + FM.
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
## 2024-05-24 ENCOUNTER — ROUTINE PRENATAL (OUTPATIENT)
Dept: OBGYN CLINIC | Age: 29
End: 2024-05-24

## 2024-05-24 VITALS
DIASTOLIC BLOOD PRESSURE: 85 MMHG | BODY MASS INDEX: 30.05 KG/M2 | WEIGHT: 148.8 LBS | HEART RATE: 151 BPM | SYSTOLIC BLOOD PRESSURE: 130 MMHG

## 2024-05-24 DIAGNOSIS — R79.89 ELEVATED LFTS: Primary | ICD-10-CM

## 2024-05-28 ENCOUNTER — TELEPHONE (OUTPATIENT)
Dept: OBGYN CLINIC | Age: 29
End: 2024-05-28

## 2024-05-28 ENCOUNTER — HOSPITAL ENCOUNTER (OUTPATIENT)
Age: 29
Setting detail: SPECIMEN
Discharge: HOME | End: 2024-05-28

## 2024-05-28 DIAGNOSIS — R10.9 FLANK PAIN: Primary | ICD-10-CM

## 2024-05-28 DIAGNOSIS — R10.9 FLANK PAIN: ICD-10-CM

## 2024-05-28 NOTE — TELEPHONE ENCOUNTER
31.3wks    Pt called stating she had spoke with someone this morning and they had put in a UA for her to do and she did do that this morning. Pt states she is still having lower flank pain and did have a mucous like discharge with a little blood. Pt states she is no longer having the discharge but still having that flank pain. Explained to pt that it could have been her mucous plug and that we will wait for that ua to come back for next steps. Also told pt to keep an eye on if she has contractions that are 5 mins apart and if so that she should go in as she could be in labor.     Pt verbalized understanding.

## 2024-05-29 LAB
MICROORGANISM SPEC CULT: ABNORMAL
SPECIMEN DESCRIPTION: ABNORMAL

## 2024-05-31 ENCOUNTER — OFFICE VISIT (OUTPATIENT)
Dept: PRIMARY CARE CLINIC | Age: 29
End: 2024-05-31
Payer: COMMERCIAL

## 2024-05-31 VITALS
OXYGEN SATURATION: 98 % | BODY MASS INDEX: 29.49 KG/M2 | SYSTOLIC BLOOD PRESSURE: 102 MMHG | HEART RATE: 91 BPM | WEIGHT: 150.2 LBS | DIASTOLIC BLOOD PRESSURE: 72 MMHG | RESPIRATION RATE: 15 BRPM | HEIGHT: 60 IN

## 2024-05-31 DIAGNOSIS — Z11.1 TUBERCULOSIS SCREENING: ICD-10-CM

## 2024-05-31 DIAGNOSIS — Z00.00 ANNUAL PHYSICAL EXAM: Primary | ICD-10-CM

## 2024-05-31 PROCEDURE — 99395 PREV VISIT EST AGE 18-39: CPT | Performed by: NURSE PRACTITIONER

## 2024-05-31 ASSESSMENT — ENCOUNTER SYMPTOMS
ABDOMINAL PAIN: 0
CHEST TIGHTNESS: 0
COLOR CHANGE: 0
NAUSEA: 0
VOMITING: 0
RHINORRHEA: 0
DIARRHEA: 0
SHORTNESS OF BREATH: 0
SORE THROAT: 0

## 2024-05-31 ASSESSMENT — PATIENT HEALTH QUESTIONNAIRE - PHQ9
SUM OF ALL RESPONSES TO PHQ QUESTIONS 1-9: 0
1. LITTLE INTEREST OR PLEASURE IN DOING THINGS: NOT AT ALL
2. FEELING DOWN, DEPRESSED OR HOPELESS: NOT AT ALL
SUM OF ALL RESPONSES TO PHQ QUESTIONS 1-9: 0
SUM OF ALL RESPONSES TO PHQ9 QUESTIONS 1 & 2: 0

## 2024-05-31 NOTE — PROGRESS NOTES
activity change, fatigue and fever.   HENT:  Negative for congestion, rhinorrhea and sore throat.    Eyes:  Negative for visual disturbance.   Respiratory:  Negative for chest tightness and shortness of breath.    Cardiovascular:  Negative for chest pain and palpitations.   Gastrointestinal:  Negative for abdominal pain, diarrhea, nausea and vomiting.   Endocrine: Negative for polydipsia.   Genitourinary:  Negative for difficulty urinating.   Musculoskeletal:  Negative for arthralgias and myalgias.   Skin:  Negative for color change.   Neurological:  Negative for weakness and headaches.   Psychiatric/Behavioral:  Negative for behavioral problems. The patient is not nervous/anxious.    All other systems reviewed and are negative.      Objective:   /72   Pulse 91   Resp 15   Ht 1.518 m (4' 11.75\")   Wt 68.1 kg (150 lb 3.2 oz)   LMP 10/08/2023 (Exact Date)   SpO2 98%   BMI 29.58 kg/m²   Physical Exam  Vitals reviewed.   Constitutional:       General: She is not in acute distress.     Appearance: Normal appearance.   HENT:      Head: Normocephalic.   Eyes:      Pupils: Pupils are equal, round, and reactive to light.   Cardiovascular:      Rate and Rhythm: Normal rate and regular rhythm.      Pulses: Normal pulses.      Heart sounds: Normal heart sounds.   Pulmonary:      Effort: Pulmonary effort is normal.      Breath sounds: Normal breath sounds.   Abdominal:      General: There is no distension.   Musculoskeletal:         General: Normal range of motion.      Cervical back: Neck supple.      Right lower leg: No edema.      Left lower leg: No edema.   Lymphadenopathy:      Cervical: No cervical adenopathy.   Skin:     General: Skin is warm and dry.      Capillary Refill: Capillary refill takes less than 2 seconds.   Neurological:      General: No focal deficit present.      Mental Status: She is alert and oriented to person, place, and time.   Psychiatric:         Mood and Affect: Mood normal.
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
## 2024-06-07 ENCOUNTER — ROUTINE PRENATAL (OUTPATIENT)
Dept: OBGYN CLINIC | Age: 29
End: 2024-06-07

## 2024-06-07 VITALS
SYSTOLIC BLOOD PRESSURE: 126 MMHG | WEIGHT: 151.6 LBS | BODY MASS INDEX: 29.86 KG/M2 | DIASTOLIC BLOOD PRESSURE: 84 MMHG | HEART RATE: 152 BPM

## 2024-06-07 DIAGNOSIS — Z3A.32 32 WEEKS GESTATION OF PREGNANCY: Primary | ICD-10-CM

## 2024-06-08 ENCOUNTER — HOSPITAL ENCOUNTER (INPATIENT)
Age: 29
LOS: 1 days | Discharge: HOME | DRG: 833 | End: 2024-06-09
Attending: OBSTETRICS & GYNECOLOGY | Admitting: OBSTETRICS & GYNECOLOGY
Payer: COMMERCIAL

## 2024-06-08 PROBLEM — Z3A.33 33 WEEKS GESTATION OF PREGNANCY: Status: ACTIVE | Noted: 2024-06-08

## 2024-06-08 PROBLEM — Z3A.30 30 WEEKS GESTATION OF PREGNANCY: Status: RESOLVED | Noted: 2024-05-21 | Resolved: 2024-06-08

## 2024-06-08 LAB
ABO + RH BLD: NORMAL
AMPHET UR QL SCN: NEGATIVE
ARM BAND NUMBER: NORMAL
BACTERIA URNS QL MICRO: ABNORMAL
BARBITURATES UR QL SCN: NEGATIVE
BASOPHILS # BLD: 0.07 K/UL (ref 0–0.2)
BASOPHILS NFR BLD: 1 % (ref 0–2)
BENZODIAZ UR QL: NEGATIVE
BILIRUB UR QL STRIP: NEGATIVE
BLOOD BANK SAMPLE EXPIRATION: NORMAL
BLOOD GROUP ANTIBODIES SERPL: NEGATIVE
CANDIDA SPECIES: NEGATIVE
CANNABINOIDS UR QL SCN: NEGATIVE
CASTS #/AREA URNS LPF: ABNORMAL /LPF (ref 0–8)
CLARITY UR: CLEAR
COCAINE UR QL SCN: NEGATIVE
COLOR UR: YELLOW
EOSINOPHIL # BLD: 0.18 K/UL (ref 0–0.44)
EOSINOPHILS RELATIVE PERCENT: 2 % (ref 1–4)
EPI CELLS #/AREA URNS HPF: ABNORMAL /HPF (ref 0–5)
ERYTHROCYTE [DISTWIDTH] IN BLOOD BY AUTOMATED COUNT: 12.4 % (ref 11.8–14.4)
FENTANYL UR QL: NEGATIVE
GARDNERELLA VAGINALIS: NEGATIVE
GLUCOSE UR STRIP-MCNC: NEGATIVE MG/DL
HCT VFR BLD AUTO: 33.8 % (ref 36.3–47.1)
HGB BLD-MCNC: 11.5 G/DL (ref 11.9–15.1)
HGB UR QL STRIP.AUTO: NEGATIVE
IMM GRANULOCYTES # BLD AUTO: 0.28 K/UL (ref 0–0.3)
IMM GRANULOCYTES NFR BLD: 2 %
KETONES UR STRIP-MCNC: NEGATIVE MG/DL
LEUKOCYTE ESTERASE UR QL STRIP: ABNORMAL
LYMPHOCYTES NFR BLD: 1.79 K/UL (ref 1.1–3.7)
LYMPHOCYTES RELATIVE PERCENT: 15 % (ref 24–43)
MCH RBC QN AUTO: 34.2 PG (ref 25.2–33.5)
MCHC RBC AUTO-ENTMCNC: 34 G/DL (ref 28.4–34.8)
MCV RBC AUTO: 100.6 FL (ref 82.6–102.9)
METHADONE UR QL: NEGATIVE
MONOCYTES NFR BLD: 0.62 K/UL (ref 0.1–1.2)
MONOCYTES NFR BLD: 5 % (ref 3–12)
NEUTROPHILS NFR BLD: 75 % (ref 36–65)
NEUTS SEG NFR BLD: 8.77 K/UL (ref 1.5–8.1)
NITRITE UR QL STRIP: NEGATIVE
NRBC BLD-RTO: 0 PER 100 WBC
OPIATES UR QL SCN: NEGATIVE
OXYCODONE UR QL SCN: NEGATIVE
PCP UR QL SCN: NEGATIVE
PH UR STRIP: 7 [PH] (ref 5–8)
PLATELET # BLD AUTO: 166 K/UL (ref 138–453)
PMV BLD AUTO: 10.5 FL (ref 8.1–13.5)
PROT UR STRIP-MCNC: NEGATIVE MG/DL
RBC # BLD AUTO: 3.36 M/UL (ref 3.95–5.11)
RBC #/AREA URNS HPF: ABNORMAL /HPF (ref 0–4)
SOURCE: NORMAL
SP GR UR STRIP: 1 (ref 1–1.03)
T PALLIDUM AB SER QL IA: NONREACTIVE
TEST INFORMATION: NORMAL
TRICHOMONAS: NEGATIVE
UROBILINOGEN UR STRIP-ACNC: NORMAL EU/DL (ref 0–1)
WBC #/AREA URNS HPF: ABNORMAL /HPF (ref 0–5)
WBC OTHER # BLD: 11.7 K/UL (ref 3.5–11.3)

## 2024-06-08 PROCEDURE — 87086 URINE CULTURE/COLONY COUNT: CPT

## 2024-06-08 PROCEDURE — 87591 N.GONORRHOEAE DNA AMP PROB: CPT

## 2024-06-08 PROCEDURE — 87491 CHLMYD TRACH DNA AMP PROBE: CPT

## 2024-06-08 PROCEDURE — 86850 RBC ANTIBODY SCREEN: CPT

## 2024-06-08 PROCEDURE — 86403 PARTICLE AGGLUT ANTBDY SCRN: CPT

## 2024-06-08 PROCEDURE — 80307 DRUG TEST PRSMV CHEM ANLYZR: CPT

## 2024-06-08 PROCEDURE — 86900 BLOOD TYPING SEROLOGIC ABO: CPT

## 2024-06-08 PROCEDURE — 85025 COMPLETE CBC W/AUTO DIFF WBC: CPT

## 2024-06-08 PROCEDURE — 87510 GARDNER VAG DNA DIR PROBE: CPT

## 2024-06-08 PROCEDURE — 2580000003 HC RX 258

## 2024-06-08 PROCEDURE — 87480 CANDIDA DNA DIR PROBE: CPT

## 2024-06-08 PROCEDURE — 86780 TREPONEMA PALLIDUM: CPT

## 2024-06-08 PROCEDURE — 81001 URINALYSIS AUTO W/SCOPE: CPT

## 2024-06-08 PROCEDURE — 86901 BLOOD TYPING SEROLOGIC RH(D): CPT

## 2024-06-08 PROCEDURE — 87660 TRICHOMONAS VAGIN DIR PROBE: CPT

## 2024-06-08 PROCEDURE — 1220000000 HC SEMI PRIVATE OB R&B

## 2024-06-08 RX ORDER — SODIUM CHLORIDE 0.9 % (FLUSH) 0.9 %
5-40 SYRINGE (ML) INJECTION EVERY 12 HOURS SCHEDULED
Status: DISCONTINUED | OUTPATIENT
Start: 2024-06-08 | End: 2024-06-08

## 2024-06-08 RX ORDER — SODIUM CHLORIDE, SODIUM LACTATE, POTASSIUM CHLORIDE, AND CALCIUM CHLORIDE .6; .31; .03; .02 G/100ML; G/100ML; G/100ML; G/100ML
1000 INJECTION, SOLUTION INTRAVENOUS PRN
Status: DISCONTINUED | OUTPATIENT
Start: 2024-06-08 | End: 2024-06-08

## 2024-06-08 RX ORDER — ONDANSETRON 2 MG/ML
4 INJECTION INTRAMUSCULAR; INTRAVENOUS EVERY 6 HOURS PRN
Status: DISCONTINUED | OUTPATIENT
Start: 2024-06-08 | End: 2024-06-09 | Stop reason: HOSPADM

## 2024-06-08 RX ORDER — ACETAMINOPHEN 500 MG
1000 TABLET ORAL EVERY 6 HOURS PRN
Status: DISCONTINUED | OUTPATIENT
Start: 2024-06-08 | End: 2024-06-08

## 2024-06-08 RX ORDER — SODIUM CHLORIDE 9 MG/ML
25 INJECTION, SOLUTION INTRAVENOUS PRN
Status: DISCONTINUED | OUTPATIENT
Start: 2024-06-08 | End: 2024-06-08

## 2024-06-08 RX ORDER — ONDANSETRON 4 MG/1
4 TABLET, ORALLY DISINTEGRATING ORAL EVERY 8 HOURS PRN
Status: DISCONTINUED | OUTPATIENT
Start: 2024-06-08 | End: 2024-06-09 | Stop reason: HOSPADM

## 2024-06-08 RX ORDER — ACETAMINOPHEN 500 MG
1000 TABLET ORAL EVERY 6 HOURS PRN
Status: DISCONTINUED | OUTPATIENT
Start: 2024-06-08 | End: 2024-06-09 | Stop reason: HOSPADM

## 2024-06-08 RX ORDER — SODIUM CHLORIDE, SODIUM LACTATE, POTASSIUM CHLORIDE, AND CALCIUM CHLORIDE .6; .31; .03; .02 G/100ML; G/100ML; G/100ML; G/100ML
1000 INJECTION, SOLUTION INTRAVENOUS ONCE
Status: COMPLETED | OUTPATIENT
Start: 2024-06-08 | End: 2024-06-08

## 2024-06-08 RX ORDER — DIPHENHYDRAMINE HCL 25 MG
25 TABLET ORAL EVERY 4 HOURS PRN
Status: DISCONTINUED | OUTPATIENT
Start: 2024-06-08 | End: 2024-06-08

## 2024-06-08 RX ORDER — VITAMIN A, ASCORBIC ACID, CHOLECALCIFEROL, .ALPHA.-TOCOPHEROL ACETATE, DL-, THIAMINE MONONITRATE, RIBOFLAVIN, NIACINAMIDE, PYRIDOXINE HYDROCHLORIDE, FOLIC ACID, CYANOCOBALAMIN, CALCIUM CARBONATE, IRON, ZINC OXIDE, AND CUPRIC OXIDE 4000; 120; 400; 22; 1.84; 3; 20; 10; 1; 12; 200; 29; 25; 2 [IU]/1; MG/1; [IU]/1; [IU]/1; MG/1; MG/1; MG/1; MG/1; MG/1; UG/1; MG/1; MG/1; MG/1; MG/1
1 TABLET ORAL DAILY
Status: DISCONTINUED | OUTPATIENT
Start: 2024-06-09 | End: 2024-06-08

## 2024-06-08 RX ORDER — DIPHENHYDRAMINE HCL 25 MG
25 TABLET ORAL EVERY 6 HOURS PRN
Status: DISCONTINUED | OUTPATIENT
Start: 2024-06-08 | End: 2024-06-08

## 2024-06-08 RX ORDER — DIPHENHYDRAMINE HYDROCHLORIDE 50 MG/ML
25 INJECTION INTRAMUSCULAR; INTRAVENOUS EVERY 4 HOURS PRN
Status: DISCONTINUED | OUTPATIENT
Start: 2024-06-08 | End: 2024-06-08

## 2024-06-08 RX ORDER — SENNA AND DOCUSATE SODIUM 50; 8.6 MG/1; MG/1
1 TABLET, FILM COATED ORAL DAILY PRN
Status: DISCONTINUED | OUTPATIENT
Start: 2024-06-08 | End: 2024-06-08

## 2024-06-08 RX ORDER — CALCIUM CARBONATE 500 MG/1
500 TABLET, CHEWABLE ORAL 3 TIMES DAILY PRN
Status: DISCONTINUED | OUTPATIENT
Start: 2024-06-08 | End: 2024-06-09 | Stop reason: HOSPADM

## 2024-06-08 RX ORDER — ONDANSETRON 2 MG/ML
4 INJECTION INTRAMUSCULAR; INTRAVENOUS EVERY 6 HOURS PRN
Status: CANCELLED | OUTPATIENT
Start: 2024-06-08

## 2024-06-08 RX ORDER — SODIUM CHLORIDE, SODIUM LACTATE, POTASSIUM CHLORIDE, CALCIUM CHLORIDE 600; 310; 30; 20 MG/100ML; MG/100ML; MG/100ML; MG/100ML
INJECTION, SOLUTION INTRAVENOUS CONTINUOUS
Status: DISCONTINUED | OUTPATIENT
Start: 2024-06-08 | End: 2024-06-09 | Stop reason: HOSPADM

## 2024-06-08 RX ORDER — VITAMIN A, ASCORBIC ACID, CHOLECALCIFEROL, .ALPHA.-TOCOPHEROL ACETATE, DL-, THIAMINE MONONITRATE, RIBOFLAVIN, NIACINAMIDE, PYRIDOXINE HYDROCHLORIDE, FOLIC ACID, CYANOCOBALAMIN, CALCIUM CARBONATE, IRON, ZINC OXIDE, AND CUPRIC OXIDE 4000; 120; 400; 22; 1.84; 3; 20; 10; 1; 12; 200; 29; 25; 2 [IU]/1; MG/1; [IU]/1; [IU]/1; MG/1; MG/1; MG/1; MG/1; MG/1; UG/1; MG/1; MG/1; MG/1; MG/1
1 TABLET ORAL DAILY
Status: DISCONTINUED | OUTPATIENT
Start: 2024-06-09 | End: 2024-06-09 | Stop reason: HOSPADM

## 2024-06-08 RX ORDER — SODIUM CHLORIDE 0.9 % (FLUSH) 0.9 %
5-40 SYRINGE (ML) INJECTION PRN
Status: DISCONTINUED | OUTPATIENT
Start: 2024-06-08 | End: 2024-06-08

## 2024-06-08 RX ORDER — SODIUM CHLORIDE, SODIUM LACTATE, POTASSIUM CHLORIDE, AND CALCIUM CHLORIDE .6; .31; .03; .02 G/100ML; G/100ML; G/100ML; G/100ML
500 INJECTION, SOLUTION INTRAVENOUS PRN
Status: DISCONTINUED | OUTPATIENT
Start: 2024-06-08 | End: 2024-06-08

## 2024-06-08 RX ORDER — LANOLIN ALCOHOL/MO/W.PET/CERES
3 CREAM (GRAM) TOPICAL NIGHTLY PRN
Status: DISCONTINUED | OUTPATIENT
Start: 2024-06-08 | End: 2024-06-09 | Stop reason: HOSPADM

## 2024-06-08 RX ORDER — ONDANSETRON 4 MG/1
4 TABLET, ORALLY DISINTEGRATING ORAL EVERY 6 HOURS PRN
Status: CANCELLED | OUTPATIENT
Start: 2024-06-08

## 2024-06-08 RX ORDER — SODIUM CHLORIDE, SODIUM LACTATE, POTASSIUM CHLORIDE, CALCIUM CHLORIDE 600; 310; 30; 20 MG/100ML; MG/100ML; MG/100ML; MG/100ML
INJECTION, SOLUTION INTRAVENOUS CONTINUOUS
Status: DISCONTINUED | OUTPATIENT
Start: 2024-06-08 | End: 2024-06-08

## 2024-06-08 RX ORDER — ASPIRIN 81 MG/1
81 TABLET, CHEWABLE ORAL DAILY
Status: DISCONTINUED | OUTPATIENT
Start: 2024-06-09 | End: 2024-06-09 | Stop reason: HOSPADM

## 2024-06-08 RX ADMIN — SODIUM CHLORIDE, POTASSIUM CHLORIDE, SODIUM LACTATE AND CALCIUM CHLORIDE 1000 ML: 600; 310; 30; 20 INJECTION, SOLUTION INTRAVENOUS at 11:16

## 2024-06-08 RX ADMIN — SODIUM CHLORIDE, POTASSIUM CHLORIDE, SODIUM LACTATE AND CALCIUM CHLORIDE: 600; 310; 30; 20 INJECTION, SOLUTION INTRAVENOUS at 20:27

## 2024-06-08 RX ADMIN — SODIUM CHLORIDE, POTASSIUM CHLORIDE, SODIUM LACTATE AND CALCIUM CHLORIDE: 600; 310; 30; 20 INJECTION, SOLUTION INTRAVENOUS at 12:43

## 2024-06-08 NOTE — FLOWSHEET NOTE
Pt presents to L and D, accompanied by fob, via ambulatory.  Pt here for lower abdominal cramping that started around 0200. Patient denies any bleeding or lof.    Pt gowned and in bed, oriented to room and call light.  EFM explained and applied.   T's 164.   Dr. Ferrer aware of admission.
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
## 2024-06-08 NOTE — CARE COORDINATION
06/08/24 1623   Readmission Assessment   Number of Days since last admission? 8-30 days   Previous Disposition Home with Family   Who is being Interviewed   (chart review)   What was the patient's/caregiver's perception as to why they think they needed to return back to the hospital? Other (Comment)  (contractions @ 33w)   Did you visit your Primary Care Physician after you left the hospital, before you returned this time? No   Why weren't you able to visit your PCP? Other (Comment)  (followed closely by OB-GYN)   Did you see a specialist, such as Cardiac, Pulmonary, Orthopedic Physician, etc. after you left the hospital? Yes   Who advised the patient to return to the hospital? Self-referral   Does the patient report anything that got in the way of taking their medications?   (n/a)   In our efforts to provide the best possible care to you and others like you, can you think of anything that we could have done to help you after you left the hospital the first time, so that you might not have needed to return so soon? Other (Comment)  (n/a)     6/8/24    Inpt    Contractions @ 33W 0D    5/21/24- 5/22/24    Outpt    Contractions

## 2024-06-08 NOTE — H&P
appearing cervix, external os visibly dilated with ectropion, no lacerations or abnormal lesions visualized, no bleeding    Sterile Vaginal Exam:  Cervix: 1 cm dilated, 30 % effaced, -2 station    DATA:  Membranes Ruptured: No  Fern: not done  Nitrazine: Not done  Valsalva/Pooling: absent  Vaginal Bleeding: absent    LIMITED BEDSIDE US:  Position: Cephalic  Placental Location: anterior  Fetal Heart Tones: Present  Fetal Movement: Present  Amniotic Fluid Index/Volume:  adequate 2x2 cm fluid pocket  Estimated Fetal Weight:  4 lbs 12 oz    PRENATAL LAB RESULTS:  Blood Type/Rh: A pos  Antibody Screen: negative  Hemoglobin, Hematocrit, Platelets: 14.6/41.5/247  Rubella: immune  T. Pallidum, IgG: non-reactive  Hepatitis B Surface Antigen: non-reactive   Hepatitis C Antibody: non-reactive   HIV: non-reactive   Gonorrhea: negative  Chlamydia: negative  Urine culture:   Negative, date: 23, 2/3/24  Positive GBS 10-50,000 CFU/mL, date: 3/6/24  Positive GBS <10,000 CFU/mL, date: 3/28/24  Positive GBS <10,000 CFU/mL, date: 24  Positive GBS 10-50,000 CFU/mL, date: 24    Hgb A1c (24): 4.4%  1 hour Glucose Tolerance Test: 119    Group B Strep: bacteruria on 3/6/24  Cystic Fibrosis Screen: ordered, not done; carrier screen unable to be run  Sickle Cell Screen: carrier screen unable to be run  First Trimester Screen: not done  msAFP: na  Non-Invasive Prenatal Testing: low risk for aneuploidy  Anatomy US (3/11/24): Anterior placenta, 3 VC, female, normal appearing anatomy    ASSESSMENT & PLAN:  Deyanira Marino is a 28 y.o. female  at 33w0d IUP   - GBS bacteruria / Rh positive / R immune   - Pen G for GBS prophylaxis if delivery is imminent    Contractions    - She reports contractions 5-10mins apart for 8 hours. +fetal movement, denies leakage of fluids, denies vaginal bleeding.   - Reports mucousy discharge.  - VSS, Afebrile              - cEFM/TOCO: Cat 1              - SVE:  /-2               - SSE:

## 2024-06-08 NOTE — CARE COORDINATION
ANTEPARTUM NOTE    33 weeks gestation of pregnancy [Z3A.33]    Deyanira  was admitted to L&D on 6/8/24  for contractions  @ 33w 0d    OB GYN Provider: Dr. Pappas    CM will meet with Deyanira to discuss discharge planning pending hospital course overnight  ( Ante vs Post-partum )

## 2024-06-09 VITALS
OXYGEN SATURATION: 100 % | WEIGHT: 151 LBS | HEART RATE: 76 BPM | DIASTOLIC BLOOD PRESSURE: 53 MMHG | TEMPERATURE: 98.1 F | BODY MASS INDEX: 29.64 KG/M2 | SYSTOLIC BLOOD PRESSURE: 92 MMHG | HEIGHT: 60 IN | RESPIRATION RATE: 17 BRPM

## 2024-06-09 LAB
MICROORGANISM SPEC CULT: ABNORMAL
SERVICE CMNT-IMP: ABNORMAL
SPECIMEN DESCRIPTION: ABNORMAL

## 2024-06-09 PROCEDURE — 2580000003 HC RX 258

## 2024-06-09 RX ADMIN — SODIUM CHLORIDE, POTASSIUM CHLORIDE, SODIUM LACTATE AND CALCIUM CHLORIDE: 600; 310; 30; 20 INJECTION, SOLUTION INTRAVENOUS at 03:33
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
## 2024-06-09 NOTE — DISCHARGE INSTRUCTIONS
Diet:   Regular                Increase Fluids  8-10 glasses/day    Activities:    As tolerated          Resume previous activity                               Additional Instructions:  Notify Physician    If any of the following                                   **Spots before eyes or blurred vision                      **Dizziness or severe Headache                                 **  Vomiting or diarrhea for several hours  **Chills & or fever                                                      **Decrease or absence of baby movement  **Vaginal pressure                                                       **Epigastric pain                                                                                                                     **  Right sided abdominal pain  **Uterine contractions are regular & 5 min. Apart         **Bag or dougherty leaking or gush of fluid from vagina     **Abdominal or menstrual like cramping that is constant or comes and goes  **Any vaginal bleeding that is heavier than a menstrual period  **Swelling of face, hands, legs or feet not decreased with rest on left side

## 2024-06-10 LAB
C TRACH DNA SPEC QL PROBE+SIG AMP: NEGATIVE
N GONORRHOEA DNA SPEC QL PROBE+SIG AMP: NEGATIVE
SPECIMEN DESCRIPTION: NORMAL

## 2024-06-17 ENCOUNTER — HOSPITAL ENCOUNTER (OUTPATIENT)
Age: 29
LOS: 1 days | Discharge: HOME | End: 2024-06-17
Attending: OBSTETRICS & GYNECOLOGY | Admitting: OBSTETRICS & GYNECOLOGY
Payer: COMMERCIAL

## 2024-06-17 ENCOUNTER — ROUTINE PRENATAL (OUTPATIENT)
Dept: PERINATAL CARE | Age: 29
End: 2024-06-17
Payer: COMMERCIAL

## 2024-06-17 VITALS
DIASTOLIC BLOOD PRESSURE: 60 MMHG | TEMPERATURE: 98.8 F | HEART RATE: 105 BPM | OXYGEN SATURATION: 99 % | RESPIRATION RATE: 16 BRPM | SYSTOLIC BLOOD PRESSURE: 97 MMHG

## 2024-06-17 VITALS
RESPIRATION RATE: 16 BRPM | HEIGHT: 60 IN | BODY MASS INDEX: 30.43 KG/M2 | SYSTOLIC BLOOD PRESSURE: 119 MMHG | HEART RATE: 120 BPM | WEIGHT: 155 LBS | DIASTOLIC BLOOD PRESSURE: 74 MMHG | TEMPERATURE: 98.1 F

## 2024-06-17 DIAGNOSIS — Z36.4 ULTRASOUND FOR ANTENATAL SCREENING FOR FETAL GROWTH RESTRICTION: ICD-10-CM

## 2024-06-17 DIAGNOSIS — Z36.3 ENCOUNTER FOR ROUTINE SCREENING FOR MALFORMATION USING ULTRASONICS: ICD-10-CM

## 2024-06-17 DIAGNOSIS — Z3A.34 34 WEEKS GESTATION OF PREGNANCY: ICD-10-CM

## 2024-06-17 DIAGNOSIS — O13.3 GESTATIONAL HYPERTENSION, THIRD TRIMESTER: Primary | ICD-10-CM

## 2024-06-17 DIAGNOSIS — O43.103 PLACENTAL ABNORMALITY IN THIRD TRIMESTER: ICD-10-CM

## 2024-06-17 PROBLEM — Z3A.33 33 WEEKS GESTATION OF PREGNANCY: Status: RESOLVED | Noted: 2024-06-08 | Resolved: 2024-06-17

## 2024-06-17 LAB
ALBUMIN SERPL-MCNC: 3.4 G/DL (ref 3.5–5.2)
ALBUMIN/GLOB SERPL: 1 {RATIO} (ref 1–2.5)
ALP SERPL-CCNC: 135 U/L (ref 35–104)
ALT SERPL-CCNC: 36 U/L (ref 10–35)
ANION GAP SERPL CALCULATED.3IONS-SCNC: 11 MMOL/L (ref 9–16)
AST SERPL-CCNC: 24 U/L (ref 10–35)
BASOPHILS # BLD: 0.08 K/UL (ref 0–0.2)
BASOPHILS NFR BLD: 1 % (ref 0–2)
BILIRUB SERPL-MCNC: <0.2 MG/DL (ref 0–1.2)
BUN SERPL-MCNC: 6 MG/DL (ref 6–20)
CALCIUM SERPL-MCNC: 8.5 MG/DL (ref 8.6–10.4)
CHLORIDE SERPL-SCNC: 105 MMOL/L (ref 98–107)
CO2 SERPL-SCNC: 21 MMOL/L (ref 20–31)
CREAT SERPL-MCNC: 0.5 MG/DL (ref 0.5–0.9)
CREAT UR-MCNC: 18.6 MG/DL (ref 28–217)
EOSINOPHIL # BLD: 0.37 K/UL (ref 0–0.44)
EOSINOPHILS RELATIVE PERCENT: 3 % (ref 1–4)
ERYTHROCYTE [DISTWIDTH] IN BLOOD BY AUTOMATED COUNT: 12.5 % (ref 11.8–14.4)
GFR, ESTIMATED: >90 ML/MIN/1.73M2
GLUCOSE SERPL-MCNC: 64 MG/DL (ref 74–99)
HCT VFR BLD AUTO: 34 % (ref 36.3–47.1)
HGB BLD-MCNC: 11.9 G/DL (ref 11.9–15.1)
IMM GRANULOCYTES # BLD AUTO: 0.44 K/UL (ref 0–0.3)
IMM GRANULOCYTES NFR BLD: 4 %
LYMPHOCYTES NFR BLD: 1.96 K/UL (ref 1.1–3.7)
LYMPHOCYTES RELATIVE PERCENT: 16 % (ref 24–43)
MCH RBC QN AUTO: 34.4 PG (ref 25.2–33.5)
MCHC RBC AUTO-ENTMCNC: 35 G/DL (ref 28.4–34.8)
MCV RBC AUTO: 98.3 FL (ref 82.6–102.9)
MONOCYTES NFR BLD: 0.83 K/UL (ref 0.1–1.2)
MONOCYTES NFR BLD: 7 % (ref 3–12)
NEUTROPHILS NFR BLD: 69 % (ref 36–65)
NEUTS SEG NFR BLD: 8.82 K/UL (ref 1.5–8.1)
NRBC BLD-RTO: 0 PER 100 WBC
PLATELET # BLD AUTO: 176 K/UL (ref 138–453)
PMV BLD AUTO: 10.3 FL (ref 8.1–13.5)
POTASSIUM SERPL-SCNC: 3.8 MMOL/L (ref 3.7–5.3)
PROT SERPL-MCNC: 6.1 G/DL (ref 6.6–8.7)
RBC # BLD AUTO: 3.46 M/UL (ref 3.95–5.11)
SODIUM SERPL-SCNC: 137 MMOL/L (ref 136–145)
TOTAL PROTEIN, URINE: <4 MG/DL
TSH SERPL DL<=0.05 MIU/L-ACNC: 2.48 UIU/ML (ref 0.27–4.2)
URINE TOTAL PROTEIN CREATININE RATIO: ABNORMAL
WBC OTHER # BLD: 12.5 K/UL (ref 3.5–11.3)

## 2024-06-17 PROCEDURE — 93005 ELECTROCARDIOGRAM TRACING: CPT

## 2024-06-17 PROCEDURE — 82570 ASSAY OF URINE CREATININE: CPT

## 2024-06-17 PROCEDURE — 84156 ASSAY OF PROTEIN URINE: CPT

## 2024-06-17 PROCEDURE — 76819 FETAL BIOPHYS PROFIL W/O NST: CPT | Performed by: OBSTETRICS & GYNECOLOGY

## 2024-06-17 PROCEDURE — 84443 ASSAY THYROID STIM HORMONE: CPT

## 2024-06-17 PROCEDURE — 85025 COMPLETE CBC W/AUTO DIFF WBC: CPT

## 2024-06-17 PROCEDURE — 2580000003 HC RX 258

## 2024-06-17 PROCEDURE — 76820 UMBILICAL ARTERY ECHO: CPT | Performed by: OBSTETRICS & GYNECOLOGY

## 2024-06-17 PROCEDURE — 80053 COMPREHEN METABOLIC PANEL: CPT

## 2024-06-17 PROCEDURE — 99213 OFFICE O/P EST LOW 20 MIN: CPT

## 2024-06-17 PROCEDURE — 99999 PR OFFICE/OUTPT VISIT,PROCEDURE ONLY: CPT | Performed by: OBSTETRICS & GYNECOLOGY

## 2024-06-17 PROCEDURE — 76805 OB US >/= 14 WKS SNGL FETUS: CPT | Performed by: OBSTETRICS & GYNECOLOGY

## 2024-06-17 RX ORDER — SODIUM CHLORIDE, SODIUM LACTATE, POTASSIUM CHLORIDE, AND CALCIUM CHLORIDE .6; .31; .03; .02 G/100ML; G/100ML; G/100ML; G/100ML
500 INJECTION, SOLUTION INTRAVENOUS ONCE
Status: COMPLETED | OUTPATIENT
Start: 2024-06-17 | End: 2024-06-17

## 2024-06-17 RX ADMIN — SODIUM CHLORIDE, POTASSIUM CHLORIDE, SODIUM LACTATE AND CALCIUM CHLORIDE 500 ML: 600; 310; 30; 20 INJECTION, SOLUTION INTRAVENOUS at 09:18

## 2024-06-17 NOTE — PROGRESS NOTES
Obstetric/Gynecology Maternal Fetal Medicine Resident Note    Resident in to evaluate patient for tachycardia. She is asymptomatic today. Thorough review of blood pressures reveal that she meets criteria for gestational hypertension. Patient is discussed with Dr. Sergey Segura. Patient is amenable to presenting to L&D triage for evaluation.     Patient declined formal consultation with [facility] attending physician for diagnosis of gestational hypertension.    Fadumo Ferrer MD  OBGYN Resident, PGY1  [facility]  6/17/2024, 8:21 AM

## 2024-06-17 NOTE — PROGRESS NOTES
Patient declines a Maternal-Fetal Medicine complete physician consultation today regarding the fetal and/or maternal medical/obstetrical complications/co-morbidities of pregnancy.      Maternal-Fetal Medicine (MFM) attending physician will defer all management for these medical/obstetrical complications of pregnancy to the primary attending obstetrical physician/provider, as a result.  Therefore, only an ultrasound evaluation was completed today in the MFM office.

## 2024-06-17 NOTE — FLOWSHEET NOTE
Patient discharged ambulatory to private vehicle. Discharge instructions given and discussed, no questions or concerns upon discharge.

## 2024-06-17 NOTE — PROGRESS NOTES
Obstetric/Gynecology Resident Interval Note    Patient seen and re-evaluated at bedside. She is resting comfortably with no complaints. She denies any lightheadedness, dizziness, palpitations, shortness of breath, pre-syncope. HR on cont pulse ox has remained in the 80s-90s since admission.  Fetal heart tracing reviewed in entirety and has been Category  I. Labs reviewed. CBC, CMP, and TSH wnl. P/C too low to calculate. Glucose noted to be 64 on CMP however patient denies any s/s of hypoglycemia. She reports she plans to eat after discharge. EKG completed and NSR.     Patient may be discharged home. Labor precautions, bleeding precautions, adequate PO hydration, fetal kick counts reviewed with the patient. All questions answered and concerns addressed. Patient verbalized understanding.     Dr. Schmid updated.    Vitals:    06/17/24 0915   BP: 97/60   Pulse: (!) 105   Resp: 16   Temp: 98.8 °F (37.1 °C)   TempSrc: Oral   SpO2: 99%       Recent Results (from the past 12 hour(s))   Protein / Creatinine Ratio, Urine    Collection Time: 06/17/24  9:23 AM   Result Value Ref Range    Total Protein, Urine <4 mg/dL    Creatinine, Ur 18.6 (L) 28.0 - 217.0 mg/dL    Urine Total Protein Creatinine Ratio Can not be calculated    TSH with Reflex    Collection Time: 06/17/24  9:24 AM   Result Value Ref Range    TSH 2.48 0.27 - 4.20 uIU/mL   CBC with Auto Differential    Collection Time: 06/17/24  9:24 AM   Result Value Ref Range    WBC 12.5 (H) 3.5 - 11.3 k/uL    RBC 3.46 (L) 3.95 - 5.11 m/uL    Hemoglobin 11.9 11.9 - 15.1 g/dL    Hematocrit 34.0 (L) 36.3 - 47.1 %    MCV 98.3 82.6 - 102.9 fL    MCH 34.4 (H) 25.2 - 33.5 pg    MCHC 35.0 (H) 28.4 - 34.8 g/dL    RDW 12.5 11.8 - 14.4 %    Platelets 176 138 - 453 k/uL    MPV 10.3 8.1 - 13.5 fL    NRBC Automated 0.0 0.0 per 100 WBC    Neutrophils % 69 (H) 36 - 65 %    Lymphocytes % 16 (L) 24 - 43 %    Monocytes % 7 3 - 12 %    Eosinophils % 3 1 - 4 %    Basophils % 1 0 - 2 %    Immature

## 2024-06-17 NOTE — FLOWSHEET NOTE
Patient to triage room 2 from [facility] d/t maternal tachycardia, no chest pain, SOB or palpitations. No c/o LOF, intermittent ctx and + fm per patient. EFM applied with FHTs 161, abdomen palpates soft. Residents aware of patients arrival.

## 2024-06-17 NOTE — H&P
her mother.    SOCIAL HISTORY:   reports that she has never smoked. She has never used smokeless tobacco. She reports that she does not currently use alcohol. She reports that she does not use drugs.    VITALS:  Vitals:    06/17/24 0915   BP: 97/60   Pulse: (!) 105   Resp: 16   Temp: 98.8 °F (37.1 °C)   TempSrc: Oral   SpO2: 99%         PHYSICAL EXAM:  Fetal Heart Monitor:  Baseline Heart Rate 150, moderate variability, present accelerations, rare variable decelerations  [place]: contractions, none    General appearance:  no apparent distress alert and cooperative  HEENT: head atraumatic, normocephalic, moist mucous membranes, trachea midline  Neurologic:  alert, oriented, normal speech, no focal findings or movement disorder noted  Lungs:  No increased work of breathing, no conversational dyspnea, equal chest wall rise bilaterally  Heart:  mildly tachycardic rate  Abdomen:  soft, gravid, non-tender, no rebound, guarding, or rigidity, no RUQ or epigastric tenderness, no signs or symptoms of abruption, no signs or symptoms of chorioamnionitis  Extremities:  no calf tenderness, non edematous, no varicosities, full range of motion in all four extremities  Musculoskeletal: Gross strength equal and intact throughout, no gross abnormalities, range of motion normal in hips, knees, shoulders and spine  Psychiatric: Mood appropriate, normal affect   Pelvic Exam: Not assessed    PRENATAL LAB RESULTS:  Blood Type/Rh: A pos  Antibody Screen: negative  Hemoglobin, Hematocrit, Platelets: 14.6/41.5/247  Rubella: immune  T. Pallidum, IgG: non-reactive  Hepatitis B Surface Antigen: non-reactive   Hepatitis C Antibody: non-reactive   HIV: non-reactive   Gonorrhea: negative  Chlamydia: negative  Urine culture:   Negative, date: 12/12/23, 2/3/24  Positive GBS 10-50,000 CFU/mL, date: 3/6/24  Positive GBS <10,000 CFU/mL, date: 3/28/24  Positive GBS <10,000 CFU/mL, date: 5/21/24  Positive GBS 10-50,000 CFU/mL, date: 5/28/24  Positive GBS

## 2024-06-18 ENCOUNTER — TELEPHONE (OUTPATIENT)
Dept: OBGYN CLINIC | Age: 29
End: 2024-06-18

## 2024-06-18 LAB
EKG ATRIAL RATE: 100 BPM
EKG P AXIS: 34 DEGREES
EKG P-R INTERVAL: 112 MS
EKG Q-T INTERVAL: 332 MS
EKG QRS DURATION: 86 MS
EKG QTC CALCULATION (BAZETT): 428 MS
EKG R AXIS: 65 DEGREES
EKG T AXIS: 25 DEGREES
EKG VENTRICULAR RATE: 100 BPM

## 2024-06-18 NOTE — TELEPHONE ENCOUNTER
Patient was at [facility] for an appointment yesterday and her heart rate was high. They had patient go to triage and she was told to contact us if it happens again. Patient states she has been having elevated heart rates today. States her resting sometimes is 120 but overall it is usually 90. States she has tightness in her chest sometimes but it does not last long.

## 2024-06-19 DIAGNOSIS — R07.89 CHEST TIGHTNESS: Primary | ICD-10-CM

## 2024-06-19 NOTE — TELEPHONE ENCOUNTER
LVM for patient letting her know the recommendations. Stated we would be referring her to a cardiologist.

## 2024-06-19 NOTE — TELEPHONE ENCOUNTER
Can you place cardiology referral for patient? If patient experiences chest tightness again, would recommend going to the ER.    DO Leeroy Coppola Ob/Gyn   6/19/2024, 10:06 AM
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
## 2024-06-21 ENCOUNTER — HOSPITAL ENCOUNTER (OUTPATIENT)
Age: 29
Setting detail: SPECIMEN
Discharge: HOME | End: 2024-06-21

## 2024-06-21 ENCOUNTER — ROUTINE PRENATAL (OUTPATIENT)
Dept: OBGYN CLINIC | Age: 29
End: 2024-06-21

## 2024-06-21 VITALS
WEIGHT: 153 LBS | BODY MASS INDEX: 30.12 KG/M2 | DIASTOLIC BLOOD PRESSURE: 74 MMHG | SYSTOLIC BLOOD PRESSURE: 113 MMHG | HEART RATE: 124 BPM

## 2024-06-21 DIAGNOSIS — O16.9 ELEVATED BLOOD PRESSURE AFFECTING PREGNANCY, ANTEPARTUM: ICD-10-CM

## 2024-06-21 DIAGNOSIS — Z3A.34 34 WEEKS GESTATION OF PREGNANCY: Primary | ICD-10-CM

## 2024-06-21 DIAGNOSIS — Z34.03 ENCOUNTER FOR SUPERVISION OF NORMAL FIRST PREGNANCY IN THIRD TRIMESTER: ICD-10-CM

## 2024-06-21 LAB
ALBUMIN SERPL-MCNC: 3.6 G/DL (ref 3.5–5.2)
ALBUMIN/GLOB SERPL: 1 {RATIO} (ref 1–2.5)
ALP SERPL-CCNC: 150 U/L (ref 35–104)
ALT SERPL-CCNC: 33 U/L (ref 10–35)
ANION GAP SERPL CALCULATED.3IONS-SCNC: 12 MMOL/L (ref 9–16)
AST SERPL-CCNC: 26 U/L (ref 10–35)
BILIRUB SERPL-MCNC: 0.2 MG/DL (ref 0–1.2)
BUN SERPL-MCNC: 9 MG/DL (ref 6–20)
CALCIUM SERPL-MCNC: 8.9 MG/DL (ref 8.6–10.4)
CHLORIDE SERPL-SCNC: 102 MMOL/L (ref 98–107)
CO2 SERPL-SCNC: 23 MMOL/L (ref 20–31)
CREAT SERPL-MCNC: 0.6 MG/DL (ref 0.5–0.9)
CREAT UR-MCNC: 55.2 MG/DL (ref 28–217)
GFR, ESTIMATED: >90 ML/MIN/1.73M2
GLUCOSE SERPL-MCNC: 153 MG/DL (ref 74–99)
POTASSIUM SERPL-SCNC: 3.5 MMOL/L (ref 3.7–5.3)
PROT SERPL-MCNC: 6.4 G/DL (ref 6.6–8.7)
SODIUM SERPL-SCNC: 137 MMOL/L (ref 136–145)
TOTAL PROTEIN, URINE: 9 MG/DL
URINE TOTAL PROTEIN CREATININE RATIO: 0.16

## 2024-06-21 PROCEDURE — 0502F SUBSEQUENT PRENATAL CARE: CPT | Performed by: OBSTETRICS & GYNECOLOGY

## 2024-06-24 ENCOUNTER — ROUTINE PRENATAL (OUTPATIENT)
Dept: PERINATAL CARE | Age: 29
End: 2024-06-24
Payer: COMMERCIAL

## 2024-06-24 VITALS
HEART RATE: 100 BPM | RESPIRATION RATE: 16 BRPM | DIASTOLIC BLOOD PRESSURE: 68 MMHG | TEMPERATURE: 98.2 F | WEIGHT: 154 LBS | SYSTOLIC BLOOD PRESSURE: 114 MMHG | BODY MASS INDEX: 31.04 KG/M2 | HEIGHT: 59 IN

## 2024-06-24 DIAGNOSIS — O43.103 PLACENTAL ABNORMALITY IN THIRD TRIMESTER: ICD-10-CM

## 2024-06-24 DIAGNOSIS — O13.3 GESTATIONAL HYPERTENSION, THIRD TRIMESTER: Primary | ICD-10-CM

## 2024-06-24 DIAGNOSIS — Z3A.35 35 WEEKS GESTATION OF PREGNANCY: ICD-10-CM

## 2024-06-24 PROCEDURE — 76819 FETAL BIOPHYS PROFIL W/O NST: CPT | Performed by: OBSTETRICS & GYNECOLOGY

## 2024-06-24 PROCEDURE — 76820 UMBILICAL ARTERY ECHO: CPT | Performed by: OBSTETRICS & GYNECOLOGY

## 2024-06-24 PROCEDURE — 76815 OB US LIMITED FETUS(S): CPT | Performed by: OBSTETRICS & GYNECOLOGY

## 2024-06-24 PROCEDURE — 99999 PR OFFICE/OUTPT VISIT,PROCEDURE ONLY: CPT | Performed by: OBSTETRICS & GYNECOLOGY

## 2024-06-28 ENCOUNTER — ROUTINE PRENATAL (OUTPATIENT)
Dept: OBGYN CLINIC | Age: 29
End: 2024-06-28
Payer: COMMERCIAL

## 2024-06-28 ENCOUNTER — HOSPITAL ENCOUNTER (OUTPATIENT)
Age: 29
Setting detail: SPECIMEN
Discharge: HOME | End: 2024-06-28

## 2024-06-28 VITALS
BODY MASS INDEX: 30.72 KG/M2 | SYSTOLIC BLOOD PRESSURE: 114 MMHG | DIASTOLIC BLOOD PRESSURE: 72 MMHG | WEIGHT: 152.4 LBS | HEIGHT: 59 IN | HEART RATE: 96 BPM

## 2024-06-28 DIAGNOSIS — O09.93 SUPERVISION OF HIGH RISK PREGNANCY IN THIRD TRIMESTER: ICD-10-CM

## 2024-06-28 DIAGNOSIS — Z3A.35 35 WEEKS GESTATION OF PREGNANCY: ICD-10-CM

## 2024-06-28 DIAGNOSIS — R82.71 GBS BACTERIURIA: ICD-10-CM

## 2024-06-28 DIAGNOSIS — O16.9 ELEVATED BLOOD PRESSURE AFFECTING PREGNANCY, ANTEPARTUM: Primary | ICD-10-CM

## 2024-06-28 DIAGNOSIS — O16.9 ELEVATED BLOOD PRESSURE AFFECTING PREGNANCY, ANTEPARTUM: ICD-10-CM

## 2024-06-28 PROBLEM — Z3A.34 34 WEEKS GESTATION OF PREGNANCY: Status: RESOLVED | Noted: 2024-06-17 | Resolved: 2024-06-28

## 2024-06-28 LAB
ALBUMIN SERPL-MCNC: 3.5 G/DL (ref 3.5–5.2)
ALBUMIN/GLOB SERPL: 1 {RATIO} (ref 1–2.5)
ALP SERPL-CCNC: 146 U/L (ref 35–104)
ALT SERPL-CCNC: 27 U/L (ref 10–35)
ANION GAP SERPL CALCULATED.3IONS-SCNC: 13 MMOL/L (ref 9–16)
AST SERPL-CCNC: 24 U/L (ref 10–35)
BASOPHILS # BLD: 0.08 K/UL (ref 0–0.2)
BASOPHILS NFR BLD: 1 % (ref 0–2)
BILIRUB SERPL-MCNC: 0.2 MG/DL (ref 0–1.2)
BUN SERPL-MCNC: 8 MG/DL (ref 6–20)
CALCIUM SERPL-MCNC: 8.9 MG/DL (ref 8.6–10.4)
CHLORIDE SERPL-SCNC: 103 MMOL/L (ref 98–107)
CO2 SERPL-SCNC: 23 MMOL/L (ref 20–31)
CREAT SERPL-MCNC: 0.7 MG/DL (ref 0.5–0.9)
CREAT UR-MCNC: 72.8 MG/DL (ref 28–217)
EOSINOPHIL # BLD: 0.18 K/UL (ref 0–0.44)
EOSINOPHILS RELATIVE PERCENT: 2 % (ref 1–4)
ERYTHROCYTE [DISTWIDTH] IN BLOOD BY AUTOMATED COUNT: 12.6 % (ref 11.8–14.4)
GFR, ESTIMATED: >90 ML/MIN/1.73M2
GLUCOSE SERPL-MCNC: 130 MG/DL (ref 74–99)
HCT VFR BLD AUTO: 37.5 % (ref 36.3–47.1)
HGB BLD-MCNC: 12.7 G/DL (ref 11.9–15.1)
IMM GRANULOCYTES # BLD AUTO: 0.27 K/UL (ref 0–0.3)
IMM GRANULOCYTES NFR BLD: 3 %
LYMPHOCYTES NFR BLD: 1.82 K/UL (ref 1.1–3.7)
LYMPHOCYTES RELATIVE PERCENT: 18 % (ref 24–43)
MCH RBC QN AUTO: 34.6 PG (ref 25.2–33.5)
MCHC RBC AUTO-ENTMCNC: 33.9 G/DL (ref 28.4–34.8)
MCV RBC AUTO: 102.2 FL (ref 82.6–102.9)
MONOCYTES NFR BLD: 0.46 K/UL (ref 0.1–1.2)
MONOCYTES NFR BLD: 5 % (ref 3–12)
NEUTROPHILS NFR BLD: 71 % (ref 36–65)
NEUTS SEG NFR BLD: 7.38 K/UL (ref 1.5–8.1)
NRBC BLD-RTO: 0 PER 100 WBC
PLATELET # BLD AUTO: 182 K/UL (ref 138–453)
PMV BLD AUTO: 10.8 FL (ref 8.1–13.5)
POTASSIUM SERPL-SCNC: 3.6 MMOL/L (ref 3.7–5.3)
PROT SERPL-MCNC: 6.4 G/DL (ref 6.6–8.7)
RBC # BLD AUTO: 3.67 M/UL (ref 3.95–5.11)
SODIUM SERPL-SCNC: 139 MMOL/L (ref 136–145)
TOTAL PROTEIN, URINE: 11 MG/DL
URINE TOTAL PROTEIN CREATININE RATIO: 0.15
WBC OTHER # BLD: 10.2 K/UL (ref 3.5–11.3)

## 2024-06-28 PROCEDURE — 59025 FETAL NON-STRESS TEST: CPT | Performed by: STUDENT IN AN ORGANIZED HEALTH CARE EDUCATION/TRAINING PROGRAM

## 2024-06-28 PROCEDURE — 0502F SUBSEQUENT PRENATAL CARE: CPT | Performed by: STUDENT IN AN ORGANIZED HEALTH CARE EDUCATION/TRAINING PROGRAM
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
## 2024-07-01 ENCOUNTER — ROUTINE PRENATAL (OUTPATIENT)
Dept: PERINATAL CARE | Age: 29
End: 2024-07-01
Payer: COMMERCIAL

## 2024-07-01 VITALS
WEIGHT: 154 LBS | BODY MASS INDEX: 31.04 KG/M2 | HEART RATE: 115 BPM | DIASTOLIC BLOOD PRESSURE: 74 MMHG | HEIGHT: 59 IN | RESPIRATION RATE: 16 BRPM | TEMPERATURE: 97.5 F | SYSTOLIC BLOOD PRESSURE: 113 MMHG

## 2024-07-01 DIAGNOSIS — O13.3 GESTATIONAL HYPERTENSION, THIRD TRIMESTER: Primary | ICD-10-CM

## 2024-07-01 DIAGNOSIS — Z3A.36 36 WEEKS GESTATION OF PREGNANCY: ICD-10-CM

## 2024-07-01 DIAGNOSIS — O43.103 PLACENTAL ABNORMALITY IN THIRD TRIMESTER: ICD-10-CM

## 2024-07-01 PROCEDURE — 76815 OB US LIMITED FETUS(S): CPT | Performed by: OBSTETRICS & GYNECOLOGY

## 2024-07-01 PROCEDURE — 99999 PR OFFICE/OUTPT VISIT,PROCEDURE ONLY: CPT | Performed by: OBSTETRICS & GYNECOLOGY

## 2024-07-01 PROCEDURE — 76819 FETAL BIOPHYS PROFIL W/O NST: CPT | Performed by: OBSTETRICS & GYNECOLOGY

## 2024-07-01 PROCEDURE — 76820 UMBILICAL ARTERY ECHO: CPT | Performed by: OBSTETRICS & GYNECOLOGY

## 2024-07-01 NOTE — PROGRESS NOTES
Obstetric/Gynecology Maternal Fetal Medicine Resident Note    Patient presented to the [facility] office for continued evaluation of maternal co-morbidities. During evaluation of vitals, patient was noted to have an elevated pulse of 115 BPM. She denied CP, SOB, or dizziness. BP was normotensive at 113/74. She has a prior diagnosis of maternal tachycardia that she is taking no medications for and gestational hypertension. Attending, Dr. Crenshaw, was notified of tachycardia in office and cleared her for discharge home. No further concerns. Patient was given return precautions and verbalized understanding and agreement with plan.    Sabrina Agrawal MD  OBGYN Resident, PGY2  [facility]  7/1/2024, 8:13 AM

## 2024-07-05 ENCOUNTER — ROUTINE PRENATAL (OUTPATIENT)
Dept: OBGYN CLINIC | Age: 29
End: 2024-07-05

## 2024-07-05 VITALS
BODY MASS INDEX: 31.51 KG/M2 | HEART RATE: 115 BPM | DIASTOLIC BLOOD PRESSURE: 73 MMHG | SYSTOLIC BLOOD PRESSURE: 104 MMHG | WEIGHT: 156 LBS

## 2024-07-05 DIAGNOSIS — O09.93 SUPERVISION OF HIGH RISK PREGNANCY IN THIRD TRIMESTER: ICD-10-CM

## 2024-07-05 DIAGNOSIS — Z3A.36 36 WEEKS GESTATION OF PREGNANCY: Primary | ICD-10-CM

## 2024-07-05 PROCEDURE — 0502F SUBSEQUENT PRENATAL CARE: CPT | Performed by: OBSTETRICS & GYNECOLOGY

## 2024-07-06 ENCOUNTER — APPOINTMENT (OUTPATIENT)
Dept: LABOR AND DELIVERY | Age: 29
End: 2024-07-06
Payer: COMMERCIAL

## 2024-07-06 ENCOUNTER — HOSPITAL ENCOUNTER (INPATIENT)
Age: 29
LOS: 4 days | Discharge: HOME | End: 2024-07-10
Attending: OBSTETRICS & GYNECOLOGY | Admitting: OBSTETRICS & GYNECOLOGY
Payer: COMMERCIAL

## 2024-07-06 ENCOUNTER — ANESTHESIA (OUTPATIENT)
Dept: LABOR AND DELIVERY | Age: 29
End: 2024-07-06
Payer: COMMERCIAL

## 2024-07-06 ENCOUNTER — ANESTHESIA EVENT (OUTPATIENT)
Dept: LABOR AND DELIVERY | Age: 29
End: 2024-07-06
Payer: COMMERCIAL

## 2024-07-06 DIAGNOSIS — Z98.891 HISTORY OF CESAREAN SECTION: Primary | ICD-10-CM

## 2024-07-06 PROBLEM — Z3A.37 37 WEEKS GESTATION OF PREGNANCY: Status: ACTIVE | Noted: 2024-07-06

## 2024-07-06 LAB
ABO + RH BLD: NORMAL
ALBUMIN SERPL-MCNC: 3.4 G/DL (ref 3.5–5.2)
ALBUMIN/GLOB SERPL: 1 {RATIO} (ref 1–2.5)
ALP SERPL-CCNC: 140 U/L (ref 35–104)
ALT SERPL-CCNC: 30 U/L (ref 10–35)
AMPHET UR QL SCN: NEGATIVE
ANION GAP SERPL CALCULATED.3IONS-SCNC: 11 MMOL/L (ref 9–16)
ARM BAND NUMBER: NORMAL
AST SERPL-CCNC: 24 U/L (ref 10–35)
BARBITURATES UR QL SCN: NEGATIVE
BENZODIAZ UR QL: NEGATIVE
BILIRUB SERPL-MCNC: <0.2 MG/DL (ref 0–1.2)
BLOOD BANK SAMPLE EXPIRATION: NORMAL
BLOOD GROUP ANTIBODIES SERPL: NEGATIVE
BUN SERPL-MCNC: 8 MG/DL (ref 6–20)
CALCIUM SERPL-MCNC: 8.5 MG/DL (ref 8.6–10.4)
CANNABINOIDS UR QL SCN: NEGATIVE
CHLORIDE SERPL-SCNC: 104 MMOL/L (ref 98–107)
CO2 SERPL-SCNC: 22 MMOL/L (ref 20–31)
COCAINE UR QL SCN: NEGATIVE
CREAT SERPL-MCNC: 0.5 MG/DL (ref 0.5–0.9)
CREAT UR-MCNC: 55.4 MG/DL (ref 28–217)
ERYTHROCYTE [DISTWIDTH] IN BLOOD BY AUTOMATED COUNT: 12.4 % (ref 11.8–14.4)
FENTANYL UR QL: NEGATIVE
GFR, ESTIMATED: >90 ML/MIN/1.73M2
GLUCOSE SERPL-MCNC: 70 MG/DL (ref 74–99)
HCT VFR BLD AUTO: 34.1 % (ref 36.3–47.1)
HGB BLD-MCNC: 11.9 G/DL (ref 11.9–15.1)
MCH RBC QN AUTO: 34.2 PG (ref 25.2–33.5)
MCHC RBC AUTO-ENTMCNC: 34.9 G/DL (ref 28.4–34.8)
MCV RBC AUTO: 98 FL (ref 82.6–102.9)
METHADONE UR QL: NEGATIVE
NRBC BLD-RTO: 0 PER 100 WBC
OPIATES UR QL SCN: NEGATIVE
OXYCODONE UR QL SCN: NEGATIVE
PCP UR QL SCN: NEGATIVE
PLATELET # BLD AUTO: 175 K/UL (ref 138–453)
PMV BLD AUTO: 10.6 FL (ref 8.1–13.5)
POTASSIUM SERPL-SCNC: 3.7 MMOL/L (ref 3.7–5.3)
PROT SERPL-MCNC: 6.1 G/DL (ref 6.6–8.7)
RBC # BLD AUTO: 3.48 M/UL (ref 3.95–5.11)
SODIUM SERPL-SCNC: 137 MMOL/L (ref 136–145)
T PALLIDUM AB SER QL IA: NONREACTIVE
TEST INFORMATION: NORMAL
TOTAL PROTEIN, URINE: 8 MG/DL
URINE TOTAL PROTEIN CREATININE RATIO: 0.15
WBC OTHER # BLD: 12.5 K/UL (ref 3.5–11.3)

## 2024-07-06 PROCEDURE — 2580000003 HC RX 258

## 2024-07-06 PROCEDURE — 86901 BLOOD TYPING SEROLOGIC RH(D): CPT

## 2024-07-06 PROCEDURE — 2500000003 HC RX 250 WO HCPCS: Performed by: NURSE ANESTHETIST, CERTIFIED REGISTERED

## 2024-07-06 PROCEDURE — 85027 COMPLETE CBC AUTOMATED: CPT

## 2024-07-06 PROCEDURE — 82570 ASSAY OF URINE CREATININE: CPT

## 2024-07-06 PROCEDURE — 84156 ASSAY OF PROTEIN URINE: CPT

## 2024-07-06 PROCEDURE — 6370000000 HC RX 637 (ALT 250 FOR IP)

## 2024-07-06 PROCEDURE — 6360000002 HC RX W HCPCS: Performed by: NURSE ANESTHETIST, CERTIFIED REGISTERED

## 2024-07-06 PROCEDURE — 86900 BLOOD TYPING SEROLOGIC ABO: CPT

## 2024-07-06 PROCEDURE — 86850 RBC ANTIBODY SCREEN: CPT

## 2024-07-06 PROCEDURE — 80307 DRUG TEST PRSMV CHEM ANLYZR: CPT

## 2024-07-06 PROCEDURE — 86780 TREPONEMA PALLIDUM: CPT

## 2024-07-06 PROCEDURE — 3700000025 EPIDURAL BLOCK: Performed by: ANESTHESIOLOGY

## 2024-07-06 PROCEDURE — 6360000002 HC RX W HCPCS

## 2024-07-06 PROCEDURE — 80053 COMPREHEN METABOLIC PANEL: CPT

## 2024-07-06 PROCEDURE — 1220000000 HC SEMI PRIVATE OB R&B

## 2024-07-06 RX ORDER — ROPIVACAINE HYDROCHLORIDE 2 MG/ML
INJECTION, SOLUTION EPIDURAL; INFILTRATION; PERINEURAL PRN
Status: DISCONTINUED | OUTPATIENT
Start: 2024-07-06 | End: 2024-07-07 | Stop reason: SDUPTHER

## 2024-07-06 RX ORDER — SODIUM CHLORIDE 0.9 % (FLUSH) 0.9 %
5-40 SYRINGE (ML) INJECTION EVERY 12 HOURS SCHEDULED
Status: DISCONTINUED | OUTPATIENT
Start: 2024-07-06 | End: 2024-07-07

## 2024-07-06 RX ORDER — SODIUM CHLORIDE 9 MG/ML
25 INJECTION, SOLUTION INTRAVENOUS PRN
Status: DISCONTINUED | OUTPATIENT
Start: 2024-07-06 | End: 2024-07-07

## 2024-07-06 RX ORDER — NALOXONE HYDROCHLORIDE 0.4 MG/ML
INJECTION, SOLUTION INTRAMUSCULAR; INTRAVENOUS; SUBCUTANEOUS PRN
Status: DISCONTINUED | OUTPATIENT
Start: 2024-07-06 | End: 2024-07-07

## 2024-07-06 RX ORDER — NALBUPHINE HYDROCHLORIDE 10 MG/ML
10 INJECTION, SOLUTION INTRAMUSCULAR; INTRAVENOUS; SUBCUTANEOUS ONCE
Status: COMPLETED | OUTPATIENT
Start: 2024-07-06 | End: 2024-07-06

## 2024-07-06 RX ORDER — SODIUM CHLORIDE 0.9 % (FLUSH) 0.9 %
5-40 SYRINGE (ML) INJECTION PRN
Status: DISCONTINUED | OUTPATIENT
Start: 2024-07-06 | End: 2024-07-07

## 2024-07-06 RX ORDER — SODIUM CHLORIDE, SODIUM LACTATE, POTASSIUM CHLORIDE, AND CALCIUM CHLORIDE .6; .31; .03; .02 G/100ML; G/100ML; G/100ML; G/100ML
500 INJECTION, SOLUTION INTRAVENOUS PRN
Status: DISCONTINUED | OUTPATIENT
Start: 2024-07-06 | End: 2024-07-07

## 2024-07-06 RX ORDER — SODIUM CHLORIDE, SODIUM LACTATE, POTASSIUM CHLORIDE, CALCIUM CHLORIDE 600; 310; 30; 20 MG/100ML; MG/100ML; MG/100ML; MG/100ML
INJECTION, SOLUTION INTRAVENOUS CONTINUOUS
Status: DISCONTINUED | OUTPATIENT
Start: 2024-07-06 | End: 2024-07-07

## 2024-07-06 RX ORDER — ROPIVACAINE HYDROCHLORIDE 2 MG/ML
INJECTION, SOLUTION EPIDURAL; INFILTRATION; PERINEURAL CONTINUOUS PRN
Status: DISCONTINUED | OUTPATIENT
Start: 2024-07-06 | End: 2024-07-07 | Stop reason: SDUPTHER

## 2024-07-06 RX ORDER — ROPIVACAINE HYDROCHLORIDE 2 MG/ML
INJECTION, SOLUTION EPIDURAL; INFILTRATION; PERINEURAL
Status: COMPLETED
Start: 2024-07-06 | End: 2024-07-06

## 2024-07-06 RX ORDER — LIDOCAINE HYDROCHLORIDE AND EPINEPHRINE 15; 5 MG/ML; UG/ML
INJECTION, SOLUTION EPIDURAL PRN
Status: DISCONTINUED | OUTPATIENT
Start: 2024-07-06 | End: 2024-07-07 | Stop reason: SDUPTHER

## 2024-07-06 RX ORDER — SEVOFLURANE 250 ML/250ML
1 LIQUID RESPIRATORY (INHALATION) CONTINUOUS PRN
Status: DISCONTINUED | OUTPATIENT
Start: 2024-07-06 | End: 2024-07-07

## 2024-07-06 RX ADMIN — NALBUPHINE HYDROCHLORIDE 10 MG: 10 INJECTION, SOLUTION INTRAMUSCULAR; INTRAVENOUS; SUBCUTANEOUS at 21:03

## 2024-07-06 RX ADMIN — SODIUM CHLORIDE, POTASSIUM CHLORIDE, SODIUM LACTATE AND CALCIUM CHLORIDE: 600; 310; 30; 20 INJECTION, SOLUTION INTRAVENOUS at 22:03

## 2024-07-06 RX ADMIN — Medication 2.5 MILLION UNITS: at 15:43

## 2024-07-06 RX ADMIN — SODIUM CHLORIDE, POTASSIUM CHLORIDE, SODIUM LACTATE AND CALCIUM CHLORIDE: 600; 310; 30; 20 INJECTION, SOLUTION INTRAVENOUS at 16:30

## 2024-07-06 RX ADMIN — Medication 2.5 MILLION UNITS: at 23:43

## 2024-07-06 RX ADMIN — Medication 2.5 MILLION UNITS: at 19:56

## 2024-07-06 RX ADMIN — Medication 5 ML: at 22:25

## 2024-07-06 RX ADMIN — SODIUM CHLORIDE, POTASSIUM CHLORIDE, SODIUM LACTATE AND CALCIUM CHLORIDE: 600; 310; 30; 20 INJECTION, SOLUTION INTRAVENOUS at 11:18

## 2024-07-06 RX ADMIN — Medication 1 MILLI-UNITS/MIN: at 16:01

## 2024-07-06 RX ADMIN — Medication 5 ML: at 22:27

## 2024-07-06 RX ADMIN — ROPIVACAINE HYDROCHLORIDE 10 ML/HR: 2 INJECTION, SOLUTION EPIDURAL; INFILTRATION; PERINEURAL at 22:30

## 2024-07-06 RX ADMIN — Medication 25 MCG: at 11:34

## 2024-07-06 RX ADMIN — SODIUM CHLORIDE 5 MILLION UNITS: 900 INJECTION INTRAVENOUS at 11:34

## 2024-07-06 RX ADMIN — LIDOCAINE HYDROCHLORIDE,EPINEPHRINE BITARTRATE 3 ML: 15; .005 INJECTION, SOLUTION EPIDURAL; INFILTRATION; INTRACAUDAL; PERINEURAL at 22:24

## 2024-07-06 ASSESSMENT — PAIN DESCRIPTION - ORIENTATION: ORIENTATION: LOWER

## 2024-07-06 ASSESSMENT — PAIN DESCRIPTION - LOCATION: LOCATION: ABDOMEN;BACK

## 2024-07-06 ASSESSMENT — PAIN SCALES - GENERAL: PAINLEVEL_OUTOF10: 8

## 2024-07-06 ASSESSMENT — PAIN DESCRIPTION - DESCRIPTORS: DESCRIPTORS: CRAMPING
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
## 2024-07-06 NOTE — PROGRESS NOTES
Labor Progress Note    Deyanira Marino is a 28 y.o. female  at 37w0d  The patient was seen and examined. Her pain is well controlled. She reports fetal movement is present, complains of contractions, denies loss of fluid, denies vaginal bleeding.       Vital Signs:  Vitals:    24 0959   BP: 110/68   Pulse: (!) 103   Resp: 17   Temp: 97.9 °F (36.6 °C)   TempSrc: Oral   SpO2: 99%        FHT: 150, moderate variability, accelerations present. Two late decelerations @ 1054, resolved with position change, and four subsequent contractions with no decelerations. Overall, category I FHT.  Contractions: regular, every 3-4 minutes    Cervical Exam: deferred  Pitocin: @ 0 mu/min    Membranes: Intact  Scalp Electrode in place: absent  Intrauterine Pressure Catheter in Place: absent    Interventions: SVE    Assessment/Plan:  Deyanira Marino is a 28 y.o. female  at 37w0d admitted for IOL 2/2 gHTN   - GBS bacteruria, Pen G for GBS prophylaxis   - VSS   - cEFM/[place]: Two late decelerations @ 1054, resolved with position change, and four subsequent contractions with no decelerations. Overall, category I FHT. Continue intrauterine resuscitative measures as needed. Monitor closely.    - S/p Cytotec 25 mcg PO x1   - Continue current care         Attending updated and in agreement with plan    Ana Laura Torres DO  Ob/Gyn Resident  2024, 1:28 PM     Resident Physician Statement  I have discussed the case, including pertinent history and exam findings with the above Resident. I have personally seen the patient. I agree with the assessment, plan and orders as documented. I have made changes to the above note as needed. I have discussed the case with above named attending.     Atul Orozco MD  OB/GYN Resident  2024  8:35 PM

## 2024-07-06 NOTE — PROGRESS NOTES
Labor Progress Note    Deyanira Marino is a 28 y.o. female  at 37w0d  The patient was seen and examined. SVE at bedside and patient tolerated well. Her pain is well controlled. She reports fetal movement is present, complains of contractions, denies loss of fluid, denies vaginal bleeding.       Vital Signs:  Vitals:    24 0959   BP: 110/68   Pulse: (!) 103   Resp: 17   Temp: 97.9 °F (36.6 °C)   TempSrc: Oral   SpO2: 99%        FHT: 150, moderate variability, accelerations present. No deelerations  Contractions: regular, every 3-4 minutes    Cervical Exam: 3/50/-1  Pitocin: @ 0 mu/min    Membranes: Intact  Scalp Electrode in place: absent  Intrauterine Pressure Catheter in Place: absent    Interventions: SVE    Assessment/Plan:  Deyanira Marino is a 28 y.o. female  at 37w0d admitted for IOL 2/2 gHTN   - GBS bacteruria, Pen G for GBS prophylaxis   - VSS   - cEFM/[place]: cat 1 FHT    - S/p Cytotec 25 mcg PO x1   - Start pitocin per protocol    - Continue current care     Attending updated and in agreement with plan    Atul Orozco MD  Ob/Gyn Resident  2024, 3:53 PM

## 2024-07-06 NOTE — PROGRESS NOTES
Obstetric/Gynecology Resident Interval Note    Fetal Heart Monitor:  Baseline Heart Rate 155, moderate variability, present accelerations, absent decelerations  [place]: contractions, regular, every 2-3 minutes    Pitocin @ 1 sean-units/hour     Patient up and walking. Continue to monitor closely.     Fadumo Ferrer MD  Ob/Gyn Resident PGY2  [facility]  7/6/2024, 6:24 PM

## 2024-07-06 NOTE — CARE COORDINATION
ANTEPARTUM NOTE    37 weeks gestation of pregnancy [Z3A.37]    Deyanira was admitted to L&D on 7/6/24 for IOL due to gHTN @ 37w0d    OB GYN Provider: Dr. Pappas    Will meet with patient after delivery to verify name/address/phone/insurance and discuss discharge planning.     Anticipate DC home 2 nights after vaginal delivery or 4 nights after C/S delivery as long as hemodynamically stable.
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
## 2024-07-07 PROBLEM — Z3A.37 37 WEEKS GESTATION OF PREGNANCY: Status: RESOLVED | Noted: 2024-07-06 | Resolved: 2024-07-07

## 2024-07-07 PROBLEM — Z98.891 HISTORY OF CESAREAN SECTION: Status: ACTIVE | Noted: 2024-07-07

## 2024-07-07 PROCEDURE — 10907ZC DRAINAGE OF AMNIOTIC FLUID, THERAPEUTIC FROM PRODUCTS OF CONCEPTION, VIA NATURAL OR ARTIFICIAL OPENING: ICD-10-PCS

## 2024-07-07 PROCEDURE — 7100000001 HC PACU RECOVERY - ADDTL 15 MIN: Performed by: OBSTETRICS & GYNECOLOGY

## 2024-07-07 PROCEDURE — 2580000003 HC RX 258

## 2024-07-07 PROCEDURE — 7100000000 HC PACU RECOVERY - FIRST 15 MIN: Performed by: OBSTETRICS & GYNECOLOGY

## 2024-07-07 PROCEDURE — 2720000010 HC SURG SUPPLY STERILE: Performed by: OBSTETRICS & GYNECOLOGY

## 2024-07-07 PROCEDURE — 3E033VJ INTRODUCTION OF OTHER HORMONE INTO PERIPHERAL VEIN, PERCUTANEOUS APPROACH: ICD-10-PCS

## 2024-07-07 PROCEDURE — 2500000003 HC RX 250 WO HCPCS

## 2024-07-07 PROCEDURE — 6360000002 HC RX W HCPCS

## 2024-07-07 PROCEDURE — 2709999900 HC NON-CHARGEABLE SUPPLY: Performed by: OBSTETRICS & GYNECOLOGY

## 2024-07-07 PROCEDURE — 3700000001 HC ADD 15 MINUTES (ANESTHESIA): Performed by: OBSTETRICS & GYNECOLOGY

## 2024-07-07 PROCEDURE — 59510 CESAREAN DELIVERY: CPT | Performed by: OBSTETRICS & GYNECOLOGY

## 2024-07-07 PROCEDURE — 2580000003 HC RX 258: Performed by: NURSE ANESTHETIST, CERTIFIED REGISTERED

## 2024-07-07 PROCEDURE — 6370000000 HC RX 637 (ALT 250 FOR IP)

## 2024-07-07 PROCEDURE — 3700000000 HC ANESTHESIA ATTENDED CARE: Performed by: OBSTETRICS & GYNECOLOGY

## 2024-07-07 PROCEDURE — 1220000000 HC SEMI PRIVATE OB R&B

## 2024-07-07 PROCEDURE — 6360000002 HC RX W HCPCS: Performed by: NURSE ANESTHETIST, CERTIFIED REGISTERED

## 2024-07-07 PROCEDURE — 3609079900 HC CESAREAN SECTION: Performed by: OBSTETRICS & GYNECOLOGY

## 2024-07-07 PROCEDURE — 94761 N-INVAS EAR/PLS OXIMETRY MLT: CPT

## 2024-07-07 RX ORDER — ACETAMINOPHEN 500 MG
1000 TABLET ORAL ONCE
Status: COMPLETED | OUTPATIENT
Start: 2024-07-07 | End: 2024-07-07

## 2024-07-07 RX ORDER — IBUPROFEN 800 MG/1
800 TABLET ORAL EVERY 8 HOURS
Status: DISCONTINUED | OUTPATIENT
Start: 2024-07-08 | End: 2024-07-10 | Stop reason: HOSPADM

## 2024-07-07 RX ORDER — SIMETHICONE 80 MG
80 TABLET,CHEWABLE ORAL 4 TIMES DAILY PRN
Qty: 180 TABLET | Refills: 3 | Status: SHIPPED | OUTPATIENT
Start: 2024-07-07

## 2024-07-07 RX ORDER — ONDANSETRON 2 MG/ML
4 INJECTION INTRAMUSCULAR; INTRAVENOUS EVERY 6 HOURS PRN
Status: DISCONTINUED | OUTPATIENT
Start: 2024-07-07 | End: 2024-07-10 | Stop reason: HOSPADM

## 2024-07-07 RX ORDER — ONDANSETRON 2 MG/ML
4 INJECTION INTRAMUSCULAR; INTRAVENOUS EVERY 6 HOURS PRN
Status: DISCONTINUED | OUTPATIENT
Start: 2024-07-07 | End: 2024-07-07

## 2024-07-07 RX ORDER — DIPHENHYDRAMINE HYDROCHLORIDE 50 MG/ML
INJECTION INTRAMUSCULAR; INTRAVENOUS PRN
Status: DISCONTINUED | OUTPATIENT
Start: 2024-07-07 | End: 2024-07-07 | Stop reason: SDUPTHER

## 2024-07-07 RX ORDER — KETOROLAC TROMETHAMINE 30 MG/ML
30 INJECTION, SOLUTION INTRAMUSCULAR; INTRAVENOUS EVERY 6 HOURS
Status: DISPENSED | OUTPATIENT
Start: 2024-07-07 | End: 2024-07-08

## 2024-07-07 RX ORDER — OXYCODONE HYDROCHLORIDE 5 MG/1
10 TABLET ORAL EVERY 4 HOURS PRN
Status: DISCONTINUED | OUTPATIENT
Start: 2024-07-07 | End: 2024-07-10 | Stop reason: HOSPADM

## 2024-07-07 RX ORDER — SCOLOPAMINE TRANSDERMAL SYSTEM 1 MG/1
1 PATCH, EXTENDED RELEASE TRANSDERMAL ONCE
Status: DISCONTINUED | OUTPATIENT
Start: 2024-07-07 | End: 2024-07-08

## 2024-07-07 RX ORDER — PHENYLEPHRINE HCL IN 0.9% NACL 1 MG/10 ML
SYRINGE (ML) INTRAVENOUS PRN
Status: DISCONTINUED | OUTPATIENT
Start: 2024-07-07 | End: 2024-07-07 | Stop reason: SDUPTHER

## 2024-07-07 RX ORDER — AZITHROMYCIN 500 MG/1
INJECTION, POWDER, LYOPHILIZED, FOR SOLUTION INTRAVENOUS PRN
Status: DISCONTINUED | OUTPATIENT
Start: 2024-07-07 | End: 2024-07-07 | Stop reason: SDUPTHER

## 2024-07-07 RX ORDER — OXYCODONE HYDROCHLORIDE 5 MG/1
5 TABLET ORAL EVERY 6 HOURS PRN
Qty: 20 TABLET | Refills: 0 | Status: SHIPPED | OUTPATIENT
Start: 2024-07-07 | End: 2024-07-12

## 2024-07-07 RX ORDER — ONDANSETRON 2 MG/ML
INJECTION INTRAMUSCULAR; INTRAVENOUS PRN
Status: DISCONTINUED | OUTPATIENT
Start: 2024-07-07 | End: 2024-07-07 | Stop reason: SDUPTHER

## 2024-07-07 RX ORDER — SODIUM CHLORIDE 0.9 % (FLUSH) 0.9 %
5-40 SYRINGE (ML) INJECTION EVERY 12 HOURS SCHEDULED
Status: DISCONTINUED | OUTPATIENT
Start: 2024-07-07 | End: 2024-07-10 | Stop reason: HOSPADM

## 2024-07-07 RX ORDER — ACETAMINOPHEN 500 MG
500 TABLET ORAL 4 TIMES DAILY PRN
Qty: 30 TABLET | Refills: 1 | Status: SHIPPED | OUTPATIENT
Start: 2024-07-07

## 2024-07-07 RX ORDER — SODIUM CHLORIDE 0.9 % (FLUSH) 0.9 %
5-40 SYRINGE (ML) INJECTION PRN
Status: DISCONTINUED | OUTPATIENT
Start: 2024-07-07 | End: 2024-07-10 | Stop reason: HOSPADM

## 2024-07-07 RX ORDER — SODIUM CHLORIDE, SODIUM LACTATE, POTASSIUM CHLORIDE, CALCIUM CHLORIDE 600; 310; 30; 20 MG/100ML; MG/100ML; MG/100ML; MG/100ML
INJECTION, SOLUTION INTRAVENOUS CONTINUOUS
Status: DISCONTINUED | OUTPATIENT
Start: 2024-07-07 | End: 2024-07-07

## 2024-07-07 RX ORDER — POLYETHYLENE GLYCOL 3350 17 G/17G
17 POWDER, FOR SOLUTION ORAL DAILY
Status: DISCONTINUED | OUTPATIENT
Start: 2024-07-07 | End: 2024-07-10 | Stop reason: HOSPADM

## 2024-07-07 RX ORDER — MORPHINE SULFATE 1 MG/ML
INJECTION, SOLUTION EPIDURAL; INTRATHECAL; INTRAVENOUS PRN
Status: DISCONTINUED | OUTPATIENT
Start: 2024-07-07 | End: 2024-07-07 | Stop reason: SDUPTHER

## 2024-07-07 RX ORDER — SODIUM CHLORIDE 9 MG/ML
INJECTION, SOLUTION INTRAVENOUS PRN
Status: DISCONTINUED | OUTPATIENT
Start: 2024-07-07 | End: 2024-07-10 | Stop reason: HOSPADM

## 2024-07-07 RX ORDER — ONDANSETRON 4 MG/1
4 TABLET, ORALLY DISINTEGRATING ORAL EVERY 6 HOURS PRN
Status: DISCONTINUED | OUTPATIENT
Start: 2024-07-07 | End: 2024-07-07

## 2024-07-07 RX ORDER — DOCUSATE SODIUM 100 MG/1
100 CAPSULE, LIQUID FILLED ORAL 2 TIMES DAILY
Status: DISCONTINUED | OUTPATIENT
Start: 2024-07-07 | End: 2024-07-08

## 2024-07-07 RX ORDER — SIMETHICONE 80 MG
80 TABLET,CHEWABLE ORAL EVERY 6 HOURS PRN
Status: DISCONTINUED | OUTPATIENT
Start: 2024-07-07 | End: 2024-07-10 | Stop reason: HOSPADM

## 2024-07-07 RX ORDER — LANOLIN 72 %
OINTMENT (GRAM) TOPICAL
Status: DISCONTINUED | OUTPATIENT
Start: 2024-07-07 | End: 2024-07-10 | Stop reason: HOSPADM

## 2024-07-07 RX ORDER — CHLOROPROCAINE HYDROCHLORIDE 30 MG/ML
INJECTION, SOLUTION EPIDURAL; INFILTRATION; INTRACAUDAL; PERINEURAL PRN
Status: DISCONTINUED | OUTPATIENT
Start: 2024-07-07 | End: 2024-07-07 | Stop reason: SDUPTHER

## 2024-07-07 RX ORDER — KETOROLAC TROMETHAMINE 30 MG/ML
INJECTION, SOLUTION INTRAMUSCULAR; INTRAVENOUS PRN
Status: DISCONTINUED | OUTPATIENT
Start: 2024-07-07 | End: 2024-07-07 | Stop reason: SDUPTHER

## 2024-07-07 RX ORDER — DEXAMETHASONE SODIUM PHOSPHATE 10 MG/ML
INJECTION, SOLUTION INTRAMUSCULAR; INTRAVENOUS PRN
Status: DISCONTINUED | OUTPATIENT
Start: 2024-07-07 | End: 2024-07-07 | Stop reason: SDUPTHER

## 2024-07-07 RX ORDER — ONDANSETRON 4 MG/1
4 TABLET, ORALLY DISINTEGRATING ORAL EVERY 8 HOURS PRN
Status: DISCONTINUED | OUTPATIENT
Start: 2024-07-07 | End: 2024-07-10 | Stop reason: HOSPADM

## 2024-07-07 RX ORDER — CITRIC ACID/SODIUM CITRATE 334-500MG
30 SOLUTION, ORAL ORAL ONCE
Status: COMPLETED | OUTPATIENT
Start: 2024-07-07 | End: 2024-07-07

## 2024-07-07 RX ORDER — TRANEXAMIC ACID 10 MG/ML
1000 INJECTION, SOLUTION INTRAVENOUS ONCE
Status: COMPLETED | OUTPATIENT
Start: 2024-07-07 | End: 2024-07-07

## 2024-07-07 RX ORDER — MIDAZOLAM HYDROCHLORIDE 1 MG/ML
INJECTION INTRAMUSCULAR; INTRAVENOUS PRN
Status: DISCONTINUED | OUTPATIENT
Start: 2024-07-07 | End: 2024-07-07 | Stop reason: SDUPTHER

## 2024-07-07 RX ORDER — AMOXICILLIN 250 MG
1 CAPSULE ORAL DAILY
Qty: 30 TABLET | Refills: 0 | Status: SHIPPED | OUTPATIENT
Start: 2024-07-07

## 2024-07-07 RX ORDER — ACETAMINOPHEN 500 MG
1000 TABLET ORAL EVERY 6 HOURS
Status: DISCONTINUED | OUTPATIENT
Start: 2024-07-07 | End: 2024-07-10 | Stop reason: HOSPADM

## 2024-07-07 RX ORDER — OXYCODONE HYDROCHLORIDE 5 MG/1
5 TABLET ORAL EVERY 4 HOURS PRN
Status: DISCONTINUED | OUTPATIENT
Start: 2024-07-07 | End: 2024-07-10 | Stop reason: HOSPADM

## 2024-07-07 RX ORDER — NALOXONE HYDROCHLORIDE 0.4 MG/ML
INJECTION, SOLUTION INTRAMUSCULAR; INTRAVENOUS; SUBCUTANEOUS PRN
Status: ACTIVE | OUTPATIENT
Start: 2024-07-07 | End: 2024-07-08

## 2024-07-07 RX ORDER — IBUPROFEN 600 MG/1
600 TABLET ORAL 4 TIMES DAILY PRN
Qty: 30 TABLET | Refills: 1 | Status: SHIPPED | OUTPATIENT
Start: 2024-07-07

## 2024-07-07 RX ORDER — SENNA AND DOCUSATE SODIUM 50; 8.6 MG/1; MG/1
2 TABLET, FILM COATED ORAL 2 TIMES DAILY
Status: DISCONTINUED | OUTPATIENT
Start: 2024-07-07 | End: 2024-07-10 | Stop reason: HOSPADM

## 2024-07-07 RX ORDER — VITAMIN A, ASCORBIC ACID, CHOLECALCIFEROL, .ALPHA.-TOCOPHEROL ACETATE, DL-, THIAMINE MONONITRATE, RIBOFLAVIN, NIACINAMIDE, PYRIDOXINE HYDROCHLORIDE, FOLIC ACID, CYANOCOBALAMIN, CALCIUM CARBONATE, IRON, ZINC OXIDE, AND CUPRIC OXIDE 4000; 120; 400; 22; 1.84; 3; 20; 10; 1; 12; 200; 29; 25; 2 [IU]/1; MG/1; [IU]/1; [IU]/1; MG/1; MG/1; MG/1; MG/1; MG/1; UG/1; MG/1; MG/1; MG/1; MG/1
1 TABLET ORAL DAILY
Status: DISCONTINUED | OUTPATIENT
Start: 2024-07-07 | End: 2024-07-10 | Stop reason: HOSPADM

## 2024-07-07 RX ADMIN — CHLOROPROCAINE HYDROCHLORIDE 5 ML: 30 INJECTION, SOLUTION EPIDURAL; INFILTRATION; INTRACAUDAL; PERINEURAL at 05:56

## 2024-07-07 RX ADMIN — PHENYLEPHRINE HYDROCHLORIDE 50 MCG/MIN: 10 INJECTION INTRAVENOUS at 06:04

## 2024-07-07 RX ADMIN — HYDROMORPHONE HYDROCHLORIDE 1 MG: 1 INJECTION, SOLUTION INTRAMUSCULAR; INTRAVENOUS; SUBCUTANEOUS at 08:01

## 2024-07-07 RX ADMIN — POLYETHYLENE GLYCOL 3350 17 G: 17 POWDER, FOR SOLUTION ORAL at 12:05

## 2024-07-07 RX ADMIN — Medication 150 MCG: at 06:04

## 2024-07-07 RX ADMIN — Medication 909 ML/HR: at 06:21

## 2024-07-07 RX ADMIN — SODIUM CHLORIDE, POTASSIUM CHLORIDE, SODIUM LACTATE AND CALCIUM CHLORIDE: 600; 310; 30; 20 INJECTION, SOLUTION INTRAVENOUS at 03:31

## 2024-07-07 RX ADMIN — ACETAMINOPHEN 1000 MG: 500 TABLET ORAL at 12:05

## 2024-07-07 RX ADMIN — CHLOROPROCAINE HYDROCHLORIDE 5 ML: 30 INJECTION, SOLUTION EPIDURAL; INFILTRATION; INTRACAUDAL; PERINEURAL at 05:58

## 2024-07-07 RX ADMIN — ACETAMINOPHEN 1000 MG: 500 TABLET ORAL at 19:04

## 2024-07-07 RX ADMIN — DEXAMETHASONE SODIUM PHOSPHATE 10 MG: 10 INJECTION, SOLUTION INTRAMUSCULAR; INTRAVENOUS at 06:23

## 2024-07-07 RX ADMIN — MORPHINE SULFATE 1 MG: 1 INJECTION, SOLUTION EPIDURAL; INTRATHECAL; INTRAVENOUS at 06:27

## 2024-07-07 RX ADMIN — KETOROLAC TROMETHAMINE 30 MG: 30 INJECTION, SOLUTION INTRAMUSCULAR; INTRAVENOUS at 06:38

## 2024-07-07 RX ADMIN — ACETAMINOPHEN 1000 MG: 500 TABLET ORAL at 05:26

## 2024-07-07 RX ADMIN — AZITHROMYCIN MONOHYDRATE 500 MG: 500 INJECTION, POWDER, LYOPHILIZED, FOR SOLUTION INTRAVENOUS at 06:06

## 2024-07-07 RX ADMIN — CHLOROPROCAINE HYDROCHLORIDE 2.5 ML: 30 INJECTION, SOLUTION EPIDURAL; INFILTRATION; INTRACAUDAL; PERINEURAL at 06:31

## 2024-07-07 RX ADMIN — SODIUM CHLORIDE, PRESERVATIVE FREE 20 MG: 5 INJECTION INTRAVENOUS at 05:31

## 2024-07-07 RX ADMIN — PHENYLEPHRINE HYDROCHLORIDE 30 MCG/MIN: 10 INJECTION INTRAVENOUS at 06:17

## 2024-07-07 RX ADMIN — PHENYLEPHRINE HYDROCHLORIDE 40 MCG/MIN: 10 INJECTION INTRAVENOUS at 06:12

## 2024-07-07 RX ADMIN — Medication 2000 MG: at 05:49

## 2024-07-07 RX ADMIN — DIPHENHYDRAMINE HYDROCHLORIDE 25 MG: 50 INJECTION INTRAMUSCULAR; INTRAVENOUS at 06:34

## 2024-07-07 RX ADMIN — Medication 87.3 MILLI-UNITS/MIN: at 07:20

## 2024-07-07 RX ADMIN — ONDANSETRON 4 MG: 2 INJECTION INTRAMUSCULAR; INTRAVENOUS at 06:23

## 2024-07-07 RX ADMIN — SODIUM CHLORIDE, PRESERVATIVE FREE 10 ML: 5 INJECTION INTRAVENOUS at 21:00

## 2024-07-07 RX ADMIN — SODIUM CHLORIDE, POTASSIUM CHLORIDE, SODIUM LACTATE AND CALCIUM CHLORIDE: 600; 310; 30; 20 INJECTION, SOLUTION INTRAVENOUS at 12:02

## 2024-07-07 RX ADMIN — CHLOROPROCAINE HYDROCHLORIDE 5 ML: 30 INJECTION, SOLUTION EPIDURAL; INFILTRATION; INTRACAUDAL; PERINEURAL at 06:00

## 2024-07-07 RX ADMIN — SODIUM CITRATE AND CITRIC ACID MONOHYDRATE 30 ML: 500; 334 SOLUTION ORAL at 05:34

## 2024-07-07 RX ADMIN — MORPHINE SULFATE 1 MG: 1 INJECTION, SOLUTION EPIDURAL; INTRATHECAL; INTRAVENOUS at 06:31

## 2024-07-07 RX ADMIN — TRANEXAMIC ACID 1000 MG: 10 INJECTION, SOLUTION INTRAVENOUS at 06:03

## 2024-07-07 RX ADMIN — KETOROLAC TROMETHAMINE 30 MG: 30 INJECTION, SOLUTION INTRAMUSCULAR; INTRAVENOUS at 13:01

## 2024-07-07 RX ADMIN — SENNOSIDES AND DOCUSATE SODIUM 2 TABLET: 50; 8.6 TABLET ORAL at 21:34

## 2024-07-07 RX ADMIN — KETOROLAC TROMETHAMINE 30 MG: 30 INJECTION, SOLUTION INTRAMUSCULAR; INTRAVENOUS at 19:05

## 2024-07-07 RX ADMIN — SODIUM CHLORIDE, POTASSIUM CHLORIDE, SODIUM LACTATE AND CALCIUM CHLORIDE: 600; 310; 30; 20 INJECTION, SOLUTION INTRAVENOUS at 07:17

## 2024-07-07 RX ADMIN — CHLOROPROCAINE HYDROCHLORIDE 5 ML: 30 INJECTION, SOLUTION EPIDURAL; INFILTRATION; INTRACAUDAL; PERINEURAL at 06:02

## 2024-07-07 RX ADMIN — ONDANSETRON 4 MG: 2 INJECTION INTRAMUSCULAR; INTRAVENOUS at 00:54

## 2024-07-07 RX ADMIN — MIDAZOLAM 1 MG: 1 INJECTION INTRAMUSCULAR; INTRAVENOUS at 06:37

## 2024-07-07 RX ADMIN — CHLOROPROCAINE HYDROCHLORIDE 2.5 ML: 30 INJECTION, SOLUTION EPIDURAL; INFILTRATION; INTRACAUDAL; PERINEURAL at 06:27

## 2024-07-07 RX ADMIN — SODIUM CHLORIDE, POTASSIUM CHLORIDE, SODIUM LACTATE AND CALCIUM CHLORIDE: 600; 310; 30; 20 INJECTION, SOLUTION INTRAVENOUS at 07:16

## 2024-07-07 RX ADMIN — DIPHENHYDRAMINE HYDROCHLORIDE 25 MG: 50 INJECTION INTRAMUSCULAR; INTRAVENOUS at 06:30

## 2024-07-07 ASSESSMENT — PAIN DESCRIPTION - DESCRIPTORS
DESCRIPTORS: SORE
DESCRIPTORS: ACHING;SORE
DESCRIPTORS: CRAMPING;THROBBING

## 2024-07-07 ASSESSMENT — PAIN DESCRIPTION - ORIENTATION
ORIENTATION: MID;LOWER
ORIENTATION: LOWER
ORIENTATION: LOWER

## 2024-07-07 ASSESSMENT — PAIN - FUNCTIONAL ASSESSMENT: PAIN_FUNCTIONAL_ASSESSMENT: ACTIVITIES ARE NOT PREVENTED

## 2024-07-07 ASSESSMENT — PAIN DESCRIPTION - PAIN TYPE: TYPE: SURGICAL PAIN

## 2024-07-07 ASSESSMENT — PAIN DESCRIPTION - LOCATION
LOCATION: ABDOMEN

## 2024-07-07 ASSESSMENT — PAIN DESCRIPTION - FREQUENCY: FREQUENCY: INTERMITTENT

## 2024-07-07 ASSESSMENT — PAIN SCALES - GENERAL
PAINLEVEL_OUTOF10: 0
PAINLEVEL_OUTOF10: 7
PAINLEVEL_OUTOF10: 0
PAINLEVEL_OUTOF10: 4
PAINLEVEL_OUTOF10: 3
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
## 2024-07-07 NOTE — PLAN OF CARE

## 2024-07-07 NOTE — FLOWSHEET NOTE
2211 KRISTEL Mei CRNA, at bedside.  Epidural procedure explained, risks discussed.  Pt verbalizes consent for epidural.   2217patient positioned for epidural. 2215- Time out completed.   2223- catheter placed. 2224- test dose given.  Epidural catheter taped and secured per anesthesia.   2229- to low fowlers with left uterine displacement.  2230- pump initiated.  Pt tolerated procedure well. See anesthesia record

## 2024-07-07 NOTE — PROGRESS NOTES
Labor Progress Note    Deyanira Marino is a 28 y.o. female  at 37w0d  The patient was seen and examined. SVE  at bedside 2/2 patient having variable and early decelerations and patient tolerated well. Her pain is well controlled with epidural in place. She reports fetal movement is present, complains of contractions, denies loss of fluid, denies vaginal bleeding.       Vital Signs:  Vitals:    24 2238 24 2240 24 2246 24 2252   BP: 109/73 117/73 112/75 112/76   Pulse: 100 93 97 96   Resp: 16 16 16 15   Temp:       TempSrc:       SpO2: 95%           FHT: 140, moderate variability, accelerations present. Variable and early decelerations  Contractions: regular, every 3-4 minutes    Chaperone present: TYRONE Marin   Cervical Exam: 3/50/-1  Pitocin: @ 4 mu/min    Membranes: Ruptured, clear  Scalp Electrode in place: absent  Intrauterine Pressure Catheter in Place: absent    Interventions: SVE    Assessment/Plan:  Deyanira Marino is a 28 y.o. female  at 37w0d admitted for IOL 2/2 gHTN   - GBS bacteruria, Pen G for GBS prophylaxis   - VSS   - cEFM/[place]: Variable and early decelerations. SVE unchanged. RN at bedside for position changes. IVF PRN. Continue intrauterine resuscitative measures PRN. Monitor closely.    - S/p Cytotec 25 mcg PO x1   - S/p AROM (clear) @    - Pitocin per protocol    - Continue current care     Attending updated and in agreement with plan    Atul Orozco MD  Ob/Gyn Resident  2024, 11:47 PM

## 2024-07-07 NOTE — PROGRESS NOTES
Obstetric/Gynecology Resident Interval Note    Fetal Heart Monitor:  Baseline Heart Rate 130, moderate variability, present accelerations, absent decelerations  [place]: contractions, regular, every 1-2 minutes    Patient resting comfortable with epidural in place and Pitocin @ 7. Continue to monitor closely       Atul Orozco MD  OB/GYN Resident, PGY3  [place], [place]  7/7/2024, 1:35 AM
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
## 2024-07-07 NOTE — PROGRESS NOTES
Labor Progress Note    Deyanira Marino is a 28 y.o. female  at 37w0d  The patient was seen and examined. SVE and AROM (clear) at bedside and patient tolerated well. Her pain is well controlled. She reports fetal movement is present, complains of contractions, denies loss of fluid, denies vaginal bleeding.       Vital Signs:  Vitals:    24 1600 24 1700 24 1800 24 1927   BP: 106/63 105/83 105/69 103/63   Pulse: 91 (!) 119 100 (!) 101   Resp:    16   Temp:    98.2 °F (36.8 °C)   TempSrc:    Oral   SpO2:    98%        FHT: 140, moderate variability, accelerations present. No deelerations  Contractions: regular, every 3-4 minutes    Chaperone present: TYRONE Marin   Cervical Exam: 3/50/-1  Pitocin: @ 4 mu/min    Membranes: Ruptured, clear  Scalp Electrode in place: absent  Intrauterine Pressure Catheter in Place: absent    Interventions: SVE, AROM     Assessment/Plan:  Deyanira Marino is a 28 y.o. female  at 37w0d admitted for IOL 2/2 gHTN   - GBS bacteruria, Pen G for GBS prophylaxis   - VSS   - cEFM/[place]: cat 1 FHT    - S/p Cytotec 25 mcg PO x1   - S/p AROM (clear) @    - Pitocin per protocol    - Continue current care     Attending updated and in agreement with plan    Atul Orozco MD  Ob/Gyn Resident  2024, 8:28 PM
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
## 2024-07-07 NOTE — PROGRESS NOTES
Obstetric/Gynecology Resident Interval Note    Writer at bedside with Attending Physician. Following being completely dilated, the patient has now been pushing with contractions for 3 hours without fetal descent. Attempted pushing included multiple positions and many different modes to attempt success. Additional assessment for vacuum assisted delivery was completed and the decision was made that a vacuum assisted delivery was not possible. Following a lengthy discussion of all R/B/A of continuing with attempted vaginal delivery versus  section, the recommendation was to proceed with  section 2/2 arrest of descent. The patient is amenable and would like to proceed with  section at this time.     Decision for  section was made secondary to arrest of descent.     Risks, benefits and alternatives were discussed extensively with the patient and her family. Patient aware of risks of bleeding, infection, scarring, injury to infant, injury to surrounding pelvic tissues/organs, need for blood/blood products, need for additional surgery including hysterectomy, as well are rare risk for cardiac arrest, thromboembolic event, pneumonia, and maternal or fetal death. All questions were answered. Reassurance given. Consent for  section signed and placed in chart, orders placed and anesthesiology notified.     Primary C/s 2/2 Arrest of Descent    - Ancef    - Bicitra    - Tylenol    - Pepcid    - Scopolamine patch    - TXA x1    - Consent done    - NICU and anesthesia notified     Atul Orozco MD  OB/GYN Resident, PGY3  [place], [place]  2024, 5:11 AM

## 2024-07-07 NOTE — ANESTHESIA PRE PROCEDURE
Department of Anesthesiology  Preprocedure Note       Name:  Deyanira Marino   Age:  28 y.o.  :  1995                                          MRN:  [id number]         Date:  2024      Surgeon: * No surgeons listed *    Procedure: * No procedures listed *    Medications prior to admission:   Prior to Admission medications    Medication Sig Start Date End Date Taking? Authorizing Provider   Prenatal Vit-Fe Fumarate-FA (PRENATAL 19) 29-1 MG CHEW Take 1 each by mouth daily 24  Sadia Munroe APRN - CNP       Current medications:    Current Facility-Administered Medications   Medication Dose Route Frequency Provider Last Rate Last Admin    lactated ringers bolus 500 mL  500 mL IntraVENous PRN Ana Laura Torres DO        Or    lactated ringers bolus 500 mL  500 mL IntraVENous PRN Ana Laura Torres DO        sodium chloride flush 0.9 % injection 5-40 mL  5-40 mL IntraVENous 2 times per day Ana Laura Torres DO        sodium chloride flush 0.9 % injection 5-40 mL  5-40 mL IntraVENous PRN Ana Laura Torres DO        0.9 % sodium chloride infusion  25 mL IntraVENous PRN Ana Laura Torres DO        penicillin G potassium 2.5 million units in 0.9% sodium chloride 100 mL IVPB  2.5 Million Units IntraVENous Q4H Ana Laura Torres DO   Stopped at 24    lactated ringers IV soln infusion   IntraVENous Continuous Atul Orozco  mL/hr at 24 2203 New Bag at 24    oxytocin (PITOCIN) 30 units in 500 mL infusion  1-20 sean-units/min IntraVENous Continuous Atul Orozco MD 4 mL/hr at 24 2030 4 sean-units/min at 24    nitrous oxide 50% inhalation 1 each  1 each Inhalation Continuous PRN Ana Laura Torres DO        ROPivacaine (NAROPIN) 0.2% injection 0.2%                Allergies:    Allergies   Allergen Reactions    Azithromycin Hives     Other reaction(s): Hives       Problem List:    Patient Active Problem List   Diagnosis Code    Hx Abnormal Pap R87.611    GBS

## 2024-07-07 NOTE — CONSULTS
Breastfeeding is going very well, mother reports 2 strong feeds and latch was comfortable. Reviewed hunger cues, benefits of skin to skin, signs of milk transfer and frequency of offering the breast every 2-3 hours. Discussed hand expression and encouraged pt to call out with questions or assistance as needed.

## 2024-07-07 NOTE — PROGRESS NOTES
Labor Progress Note    Deyanira Marino is a 28 y.o. female  at 37w0d  The patient was seen and examined. Her pain is well controlled. She reports fetal movement is present, complains of contractions, complains of loss of fluid, denies vaginal bleeding.       Vital Signs:  Vitals:    24 2226 24 2230 24 2234 24 2238   BP: 129/85 115/69 113/73 109/73   Pulse: (!) 108 98 91 100   Resp: 20 18 16 16   Temp:   98.3 °F (36.8 °C)    TempSrc:   Oral    SpO2:    95%        FHT: 135, moderate variability, accelerations present, decelerations absent  Contractions: regular, every 2-3 minutes    Chaperone for Intimate Exam: Chaperone was present for entire exam, Chaperone Name: TYRONE Marin  Pitocin: @ 4 mu/min    Membranes: Ruptured clear fluid  Scalp Electrode in place: absent  Intrauterine Pressure Catheter in Place: absent    Interventions: none    Assessment/Plan:  Deyanira Marino is a 28 y.o. female  at 37w0d admitted for IL 2/2 gHTN    - GBS bacteruria, Pen G for GBS prophylaxis              - VSS              - cEFM/Doniphan: cat 1 FHT               - S/p Cytotec 25 mcg PO x1              - S/p AROM (clear) @               - Pitocin per protocol               - Continue current care     Attending updated and in agreement with plan    Ana Laura Torres DO  Ob/Gyn Resident  2024, 10:47 PM

## 2024-07-07 NOTE — ANESTHESIA PROCEDURE NOTES
Epidural Block    Patient location during procedure: OB  Start time: 7/6/2024 10:19 PM  End time: 7/6/2024 10:28 PM  Reason for block: labor epidural  Staffing  Performed: resident/CRNA   Resident/CRNA: Rc Mei APRN - CRNA  Performed by: Rc Mei APRN - CRNA  Authorized by: Bandar Rhodes MD    Epidural  Patient position: sitting  Prep: Betadine  Patient monitoring: continuous pulse ox and frequent blood pressure checks  Approach: midline  Location: L3-4  Injection technique: GABRIELLE air  Guidance: paresthesia technique  Provider prep: mask and sterile gloves  Needle  Needle gauge: 17 G  Needle length: 3.5 in  Needle insertion depth: 5 cm  Catheter type: multi-orifice  Catheter size: 19 G  Catheter at skin depth: 11 cm  Test dose: negativeCatheter Secured: tegaderm and tape  Assessment  Sensory level: T6  Hemodynamics: stable  Attempts: 1  Outcomes: uncomplicated and patient tolerated procedure well  Preanesthetic Checklist  Completed: patient identified, IV checked, risks and benefits discussed, equipment checked, pre-op evaluation, timeout performed, anesthesia consent given, oxygen available and monitors applied/VS acknowledged

## 2024-07-08 LAB
ALBUMIN SERPL-MCNC: 2.7 G/DL (ref 3.5–5.2)
ALBUMIN/GLOB SERPL: 1 {RATIO} (ref 1–2.5)
ALP SERPL-CCNC: 110 U/L (ref 35–104)
ALT SERPL-CCNC: 27 U/L (ref 10–35)
ANION GAP SERPL CALCULATED.3IONS-SCNC: 11 MMOL/L (ref 9–16)
AST SERPL-CCNC: 35 U/L (ref 10–35)
BASOPHILS # BLD: 0.07 K/UL (ref 0–0.2)
BASOPHILS NFR BLD: 0 % (ref 0–2)
BILIRUB SERPL-MCNC: <0.2 MG/DL (ref 0–1.2)
BUN SERPL-MCNC: 8 MG/DL (ref 6–20)
CALCIUM SERPL-MCNC: 8.4 MG/DL (ref 8.6–10.4)
CHLORIDE SERPL-SCNC: 105 MMOL/L (ref 98–107)
CO2 SERPL-SCNC: 21 MMOL/L (ref 20–31)
CREAT SERPL-MCNC: 0.6 MG/DL (ref 0.5–0.9)
EOSINOPHIL # BLD: 0.39 K/UL (ref 0–0.44)
EOSINOPHILS RELATIVE PERCENT: 2 % (ref 1–4)
ERYTHROCYTE [DISTWIDTH] IN BLOOD BY AUTOMATED COUNT: 12.8 % (ref 11.8–14.4)
ERYTHROCYTE [DISTWIDTH] IN BLOOD BY AUTOMATED COUNT: 12.8 % (ref 11.8–14.4)
GFR, ESTIMATED: >90 ML/MIN/1.73M2
GLUCOSE SERPL-MCNC: 108 MG/DL (ref 74–99)
HCT VFR BLD AUTO: 28.9 % (ref 36.3–47.1)
HCT VFR BLD AUTO: 30.2 % (ref 36.3–47.1)
HGB BLD-MCNC: 9.3 G/DL (ref 11.9–15.1)
HGB BLD-MCNC: 9.9 G/DL (ref 11.9–15.1)
IMM GRANULOCYTES # BLD AUTO: 0.24 K/UL (ref 0–0.3)
IMM GRANULOCYTES NFR BLD: 2 %
LYMPHOCYTES NFR BLD: 2.82 K/UL (ref 1.1–3.7)
LYMPHOCYTES RELATIVE PERCENT: 18 % (ref 24–43)
MCH RBC QN AUTO: 33.7 PG (ref 25.2–33.5)
MCH RBC QN AUTO: 34.1 PG (ref 25.2–33.5)
MCHC RBC AUTO-ENTMCNC: 32.2 G/DL (ref 28.4–34.8)
MCHC RBC AUTO-ENTMCNC: 32.8 G/DL (ref 28.4–34.8)
MCV RBC AUTO: 104.1 FL (ref 82.6–102.9)
MCV RBC AUTO: 104.7 FL (ref 82.6–102.9)
MONOCYTES NFR BLD: 0.72 K/UL (ref 0.1–1.2)
MONOCYTES NFR BLD: 5 % (ref 3–12)
NEUTROPHILS NFR BLD: 73 % (ref 36–65)
NEUTS SEG NFR BLD: 11.83 K/UL (ref 1.5–8.1)
NRBC BLD-RTO: 0 PER 100 WBC
NRBC BLD-RTO: 0 PER 100 WBC
PLATELET # BLD AUTO: 159 K/UL (ref 138–453)
PLATELET # BLD AUTO: 167 K/UL (ref 138–453)
PMV BLD AUTO: 10.5 FL (ref 8.1–13.5)
PMV BLD AUTO: 10.8 FL (ref 8.1–13.5)
POTASSIUM SERPL-SCNC: 3.5 MMOL/L (ref 3.7–5.3)
PROT SERPL-MCNC: 5.3 G/DL (ref 6.6–8.7)
RBC # BLD AUTO: 2.76 M/UL (ref 3.95–5.11)
RBC # BLD AUTO: 2.9 M/UL (ref 3.95–5.11)
RBC # BLD: ABNORMAL 10*6/UL
SODIUM SERPL-SCNC: 137 MMOL/L (ref 136–145)
WBC OTHER # BLD: 16.1 K/UL (ref 3.5–11.3)
WBC OTHER # BLD: 20.5 K/UL (ref 3.5–11.3)

## 2024-07-08 PROCEDURE — 80053 COMPREHEN METABOLIC PANEL: CPT

## 2024-07-08 PROCEDURE — 82570 ASSAY OF URINE CREATININE: CPT

## 2024-07-08 PROCEDURE — 87086 URINE CULTURE/COLONY COUNT: CPT

## 2024-07-08 PROCEDURE — 36415 COLL VENOUS BLD VENIPUNCTURE: CPT

## 2024-07-08 PROCEDURE — 85025 COMPLETE CBC W/AUTO DIFF WBC: CPT

## 2024-07-08 PROCEDURE — 6360000002 HC RX W HCPCS

## 2024-07-08 PROCEDURE — 1220000000 HC SEMI PRIVATE OB R&B

## 2024-07-08 PROCEDURE — 6370000000 HC RX 637 (ALT 250 FOR IP)

## 2024-07-08 PROCEDURE — 85027 COMPLETE CBC AUTOMATED: CPT

## 2024-07-08 PROCEDURE — 2580000003 HC RX 258

## 2024-07-08 PROCEDURE — 84156 ASSAY OF PROTEIN URINE: CPT

## 2024-07-08 RX ORDER — FERROUS SULFATE 325(65) MG
325 TABLET ORAL DAILY
Qty: 60 TABLET | Refills: 1 | Status: SHIPPED | OUTPATIENT
Start: 2024-07-08

## 2024-07-08 RX ORDER — CYCLOBENZAPRINE HCL 10 MG
10 TABLET ORAL 3 TIMES DAILY PRN
Status: DISCONTINUED | OUTPATIENT
Start: 2024-07-08 | End: 2024-07-10 | Stop reason: HOSPADM

## 2024-07-08 RX ADMIN — IBUPROFEN 800 MG: 800 TABLET, FILM COATED ORAL at 07:16

## 2024-07-08 RX ADMIN — IBUPROFEN 800 MG: 800 TABLET, FILM COATED ORAL at 13:32

## 2024-07-08 RX ADMIN — ACETAMINOPHEN 1000 MG: 500 TABLET ORAL at 01:13

## 2024-07-08 RX ADMIN — ACETAMINOPHEN 1000 MG: 500 TABLET ORAL at 19:35

## 2024-07-08 RX ADMIN — IBUPROFEN 800 MG: 800 TABLET, FILM COATED ORAL at 19:36

## 2024-07-08 RX ADMIN — OXYCODONE 5 MG: 5 TABLET ORAL at 03:40

## 2024-07-08 RX ADMIN — SIMETHICONE 80 MG: 80 TABLET, CHEWABLE ORAL at 19:36

## 2024-07-08 RX ADMIN — POLYETHYLENE GLYCOL 3350 17 G: 17 POWDER, FOR SOLUTION ORAL at 09:29

## 2024-07-08 RX ADMIN — SODIUM CHLORIDE, PRESERVATIVE FREE 10 ML: 5 INJECTION INTRAVENOUS at 09:29

## 2024-07-08 RX ADMIN — CYCLOBENZAPRINE 10 MG: 10 TABLET, FILM COATED ORAL at 20:30

## 2024-07-08 RX ADMIN — KETOROLAC TROMETHAMINE 30 MG: 30 INJECTION, SOLUTION INTRAMUSCULAR; INTRAVENOUS at 01:13

## 2024-07-08 RX ADMIN — SIMETHICONE 80 MG: 80 TABLET, CHEWABLE ORAL at 01:19

## 2024-07-08 RX ADMIN — ACETAMINOPHEN 1000 MG: 500 TABLET ORAL at 07:16

## 2024-07-08 RX ADMIN — ONDANSETRON 4 MG: 4 TABLET, ORALLY DISINTEGRATING ORAL at 20:31

## 2024-07-08 RX ADMIN — SENNOSIDES AND DOCUSATE SODIUM 2 TABLET: 50; 8.6 TABLET ORAL at 19:36

## 2024-07-08 RX ADMIN — SENNOSIDES AND DOCUSATE SODIUM 2 TABLET: 50; 8.6 TABLET ORAL at 09:28

## 2024-07-08 RX ADMIN — SODIUM CHLORIDE, PRESERVATIVE FREE 10 ML: 5 INJECTION INTRAVENOUS at 19:44

## 2024-07-08 RX ADMIN — OXYCODONE 5 MG: 5 TABLET ORAL at 15:19

## 2024-07-08 RX ADMIN — Medication 1 TABLET: at 09:28

## 2024-07-08 RX ADMIN — ACETAMINOPHEN 1000 MG: 500 TABLET ORAL at 13:32

## 2024-07-08 ASSESSMENT — PAIN DESCRIPTION - DESCRIPTORS
DESCRIPTORS: SORE
DESCRIPTORS: SORE
DESCRIPTORS: ACHING;CRAMPING;DISCOMFORT
DESCRIPTORS: SORE
DESCRIPTORS: ACHING;CRAMPING;DISCOMFORT

## 2024-07-08 ASSESSMENT — PAIN SCALES - GENERAL
PAINLEVEL_OUTOF10: 4
PAINLEVEL_OUTOF10: 6
PAINLEVEL_OUTOF10: 4
PAINLEVEL_OUTOF10: 7
PAINLEVEL_OUTOF10: 5

## 2024-07-08 ASSESSMENT — PAIN DESCRIPTION - ORIENTATION
ORIENTATION: LOWER

## 2024-07-08 ASSESSMENT — PAIN DESCRIPTION - FREQUENCY
FREQUENCY: INTERMITTENT

## 2024-07-08 ASSESSMENT — PAIN DESCRIPTION - LOCATION
LOCATION: ABDOMEN

## 2024-07-08 ASSESSMENT — PAIN DESCRIPTION - PAIN TYPE
TYPE: SURGICAL PAIN

## 2024-07-08 ASSESSMENT — PAIN - FUNCTIONAL ASSESSMENT
PAIN_FUNCTIONAL_ASSESSMENT: ACTIVITIES ARE NOT PREVENTED

## 2024-07-08 NOTE — LACTATION NOTE
Pt called out to check latch. Baby feeding well on left breast in football hold. Mother with good technique. Reviewed normal round shape of nipple after feed and deep latch techniques. Assisted mother with latching to right breast in cross cradle hold. With verbal instruction, mother able to latch deeply to right breast. Short burst of suckles and baby remains latched. Reviewed to stimulate and do breast compressions to encourage continued feeding.

## 2024-07-08 NOTE — ANESTHESIA POSTPROCEDURE EVALUATION
Department of Anesthesiology  Postprocedure Note    Patient: Deyanira Marino  MRN: [id number]  YOB: 1995  Date of evaluation: 2024    Procedure Summary       Date: 24 Room / Location: [address]    Anesthesia Start:  Anesthesia Stop: 24 0709    Procedures:        SECTION (Abdomen)      Labor Analgesia Diagnosis:       Arrest of descent, delivered, current hospitalization      (Arrest of descent, delivered, current hospitalization [O62.1])    Surgeons: Lilia Pappas MD Responsible Provider: Bandar Rhodes MD    Anesthesia Type: epidural ASA Status: 2            Anesthesia Type: No value filed.    Eddie Phase I:      Eddie Phase II:      Anesthesia Post Evaluation    Patient location during evaluation: floor  Patient participation: complete - patient participated  Level of consciousness: awake  Airway patency: patent  Nausea & Vomiting: no nausea and no vomiting  Cardiovascular status: blood pressure returned to baseline  Respiratory status: acceptable  Hydration status: euvolemic  Comments: Adequate recovery from  Spinal for C/section.  No residual effects.  Multimodal analgesia pain management approach        No notable events documented.

## 2024-07-08 NOTE — CARE COORDINATION
CASE MANAGEMENT POST-PARTUM TRANSITIONAL CARE PLAN    37 weeks gestation of pregnancy [Z3A.37]    OB Provider: Dr. Cohen    Writer met w/ Deyanira and her  Issa at her bedside to discuss DCP. She is S/P C/S on 24 @ 37w1d at 0619 of female infant    Writer verified address/phone number correct on facesheet. She states she lives with her . She denied barriers with transportation home, to doctors appointments or with paying for medications upon discharge home.     [employer] insurance correct. Writer notified them they have 30 days from date of birth to add  to insurance policy. They verbalized understanding.    Infant name on BC: Gloria.   Infant PCP Dr. Garg.     DME: BP Cuff at bedside  HOME CARE: none    Anticipate DC home of couplet in private vehicle in 2-4 days status post C/S.    Readmission Risk              Risk of Unplanned Readmission:  7

## 2024-07-08 NOTE — PLAN OF CARE
Problem: Vaginal Birth or  Section  Goal: Fetal and maternal status remain reassuring during the birth process  Description:  Birth OB-Pregnancy care plan goal which identifies if the fetal and maternal status remain reassuring during the birth process  Outcome: Completed     Problem: Postpartum  Goal: Experiences normal postpartum course  Description:  Postpartum OB-Pregnancy care plan goal which identifies if the mother is experiencing a normal postpartum course  Outcome: Progressing  Goal: Appropriate maternal -  bonding  Description:  Postpartum OB-Pregnancy care plan goal which identifies if the mother and  are bonding appropriately  Outcome: Progressing  Goal: Establishment of infant feeding pattern  Description:  Postpartum OB-Pregnancy care plan goal which identifies if the mother is establishing a feeding pattern with their   Outcome: Progressing  Goal: Incisions, wounds, or drain sites healing without S/S of infection  Outcome: Progressing     Problem: Pain  Goal: Verbalizes/displays adequate comfort level or baseline comfort level  Outcome: Progressing  Flowsheets (Taken 2024)  Verbalizes/displays adequate comfort level or baseline comfort level:   Encourage patient to monitor pain and request assistance   Assess pain using appropriate pain scale   Administer analgesics based on type and severity of pain and evaluate response   Implement non-pharmacological measures as appropriate and evaluate response     Problem: Infection - Adult  Goal: Absence of infection at discharge  Outcome: Progressing     Problem: Infection - Adult  Goal: Absence of infection at discharge  Outcome: Progressing  Goal: Absence of infection during hospitalization  Outcome: Progressing  Goal: Absence of fever/infection during anticipated neutropenic period  Outcome: Progressing     Problem: Safety - Adult  Goal: Free from fall injury  Outcome: Progressing     Problem: Discharge Planning  Goal:  Discharge to home or other facility with appropriate resources  Outcome: Progressing

## 2024-07-08 NOTE — CARE COORDINATION
Social Work     Sw reviewed medical record (current active problem list) and tox screens and found no current concerns.     Sw spoke with mom and fob briefly to explain Sw role, inquire if any needs or concerns, and provide safe sleep education and discuss.  Mom denied any needs or questions and informs baby has a safe sleep environment (crib, bass, pnp).     Mom denied any current s/s of anxiety or depression and is aware to reach out to OB if any s/s occur after dc.    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       Mom reports a really good support system, this is first baby on both sides, and denied any current questions or needs.      Mom reports this is her 1st baby.       Mom states ped will be Dr. Garg.      Sw encouraged parents to reach out if any issues or concerns arise.

## 2024-07-09 LAB
CREAT UR-MCNC: 37.9 MG/DL (ref 28–217)
MICROORGANISM SPEC CULT: NO GROWTH
SPECIMEN DESCRIPTION: NORMAL
TOTAL PROTEIN, URINE: 7 MG/DL
URINE TOTAL PROTEIN CREATININE RATIO: 0.18

## 2024-07-09 PROCEDURE — 1220000000 HC SEMI PRIVATE OB R&B

## 2024-07-09 PROCEDURE — 94760 N-INVAS EAR/PLS OXIMETRY 1: CPT

## 2024-07-09 PROCEDURE — 2700000000 HC OXYGEN THERAPY PER DAY

## 2024-07-09 PROCEDURE — 6370000000 HC RX 637 (ALT 250 FOR IP)

## 2024-07-09 RX ADMIN — Medication 1 TABLET: at 10:41

## 2024-07-09 RX ADMIN — OXYCODONE 5 MG: 5 TABLET ORAL at 06:48

## 2024-07-09 RX ADMIN — ACETAMINOPHEN 1000 MG: 500 TABLET ORAL at 10:40

## 2024-07-09 RX ADMIN — ACETAMINOPHEN 1000 MG: 500 TABLET ORAL at 17:30

## 2024-07-09 RX ADMIN — OXYCODONE 5 MG: 5 TABLET ORAL at 12:18

## 2024-07-09 RX ADMIN — ACETAMINOPHEN 1000 MG: 500 TABLET ORAL at 23:35

## 2024-07-09 RX ADMIN — IBUPROFEN 800 MG: 800 TABLET, FILM COATED ORAL at 12:18

## 2024-07-09 RX ADMIN — IBUPROFEN 800 MG: 800 TABLET, FILM COATED ORAL at 20:17

## 2024-07-09 RX ADMIN — ONDANSETRON 4 MG: 4 TABLET, ORALLY DISINTEGRATING ORAL at 22:57

## 2024-07-09 RX ADMIN — SIMETHICONE 80 MG: 80 TABLET, CHEWABLE ORAL at 12:23

## 2024-07-09 RX ADMIN — IBUPROFEN 800 MG: 800 TABLET, FILM COATED ORAL at 04:12

## 2024-07-09 RX ADMIN — SENNOSIDES AND DOCUSATE SODIUM 2 TABLET: 50; 8.6 TABLET ORAL at 10:41

## 2024-07-09 RX ADMIN — SIMETHICONE 80 MG: 80 TABLET, CHEWABLE ORAL at 22:57

## 2024-07-09 RX ADMIN — ACETAMINOPHEN 1000 MG: 500 TABLET ORAL at 04:12

## 2024-07-09 RX ADMIN — SENNOSIDES AND DOCUSATE SODIUM 2 TABLET: 50; 8.6 TABLET ORAL at 20:17

## 2024-07-09 RX ADMIN — POTASSIUM BICARBONATE 40 MEQ: 782 TABLET, EFFERVESCENT ORAL at 10:41

## 2024-07-09 ASSESSMENT — PAIN DESCRIPTION - FREQUENCY
FREQUENCY: INTERMITTENT
FREQUENCY: INTERMITTENT

## 2024-07-09 ASSESSMENT — PAIN DESCRIPTION - DESCRIPTORS
DESCRIPTORS: DISCOMFORT;SORE
DESCRIPTORS: SORE
DESCRIPTORS: DISCOMFORT;SORE

## 2024-07-09 ASSESSMENT — PAIN SCALES - GENERAL
PAINLEVEL_OUTOF10: 5
PAINLEVEL_OUTOF10: 6
PAINLEVEL_OUTOF10: 4
PAINLEVEL_OUTOF10: 5
PAINLEVEL_OUTOF10: 6
PAINLEVEL_OUTOF10: 4

## 2024-07-09 ASSESSMENT — PAIN DESCRIPTION - PAIN TYPE
TYPE: SURGICAL PAIN

## 2024-07-09 ASSESSMENT — PAIN DESCRIPTION - ORIENTATION
ORIENTATION: LOWER

## 2024-07-09 ASSESSMENT — PAIN DESCRIPTION - LOCATION
LOCATION: ABDOMEN;INCISION
LOCATION: ABDOMEN

## 2024-07-09 ASSESSMENT — PAIN - FUNCTIONAL ASSESSMENT
PAIN_FUNCTIONAL_ASSESSMENT: ACTIVITIES ARE NOT PREVENTED

## 2024-07-09 NOTE — PROGRESS NOTES
CLINICAL PHARMACY NOTE: MEDS TO BEDS    Total # of Prescriptions Filled: 6   The following medications were delivered to the patient:  Acetaminophen ES  Gas relief chew  Oxycodone  Senexon-s  Ibuprofen  ferosul    Additional Documentation: manjeet cortez

## 2024-07-09 NOTE — FLOWSHEET NOTE
Initiation of Electric Breast Pumping     Pumping Initiated at 1435    Initiated due to    []   Baby in NICU   []   Plans exclusive pumping   []   Infant weight loss(supplement)   [x]   Baby not latching well    Flange Size    Right:   Left:     [x]   24    [x]   24     []   27    []   27     []   30    []   30     []   36    []   36  Instructions   [x]   Verbal instructions on how to setup pump and how to use initiation phase   [x]   Written sheet\" How to keep your breast pump kit clean\"   []   Expectation sheet for Breastfeeding mothers with pumping log   [x]   Frequency of pumping   [x]   Collection,labeling and storage of colostrum and milk    Supplies Provided   [x]   Pump initiation kit   [x]   Cleaning supplies (basin and soap)   []   Additional flange size   []   Oral syringes/snappies   [x]   Patient labels       -

## 2024-07-09 NOTE — LACTATION NOTE
Pt reports baby had a sleepy stretch/trouble latching during the night, but has improved this morning. Baby at breast while writer in room, strong sustained suck and intermittent swallows noted. Encouraged pt to call out as needed. Reviewed signs of milk transfer and how to know if baby is getting enough.

## 2024-07-09 NOTE — PROGRESS NOTES
Obstetric/Gynecology Resident Interval Note    Resident to bedside as RN reporting that patient was experiencing spots in her vision, having body shakes, and feeling pain in her left leg behind her knee going to her foot. Upon entry to room patient is resting comfortably in bed holding baby. VSS, afebrile. Patient reports that the spots in her vision are intermittent and that her body shakes have stopped. When discussed, patient feels that her symptoms might have been attributed to anxiety, however declined medications.    Stat CBC, CMP, P/C ordered. Flexeril ordered and gabapentin offered for nerve pain, however patient declined. Patient denies other s/sx PreE. Left leg and foot strength and sensation intact, no calf tenderness noted. Follow lab results and continue to monitor closely.    Kirstie Castillo DO  OB/GYN Resident, PGY2  [place], [place]  7/8/2024, 8:16 PM
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
## 2024-07-09 NOTE — LACTATION NOTE
LC assistance requested. Refined baby's position at the left breast in football hold. Baby mouthed breast, but no latch. Taught cross cradle position. Baby fell asleep. Reassurance given. Mom will call out when baby alerts.

## 2024-07-10 VITALS
OXYGEN SATURATION: 98 % | RESPIRATION RATE: 16 BRPM | HEART RATE: 78 BPM | DIASTOLIC BLOOD PRESSURE: 68 MMHG | TEMPERATURE: 97.7 F | SYSTOLIC BLOOD PRESSURE: 97 MMHG

## 2024-07-10 PROCEDURE — 6370000000 HC RX 637 (ALT 250 FOR IP)

## 2024-07-10 RX ADMIN — SENNOSIDES AND DOCUSATE SODIUM 2 TABLET: 50; 8.6 TABLET ORAL at 07:38

## 2024-07-10 RX ADMIN — ACETAMINOPHEN 1000 MG: 500 TABLET ORAL at 05:58

## 2024-07-10 RX ADMIN — ACETAMINOPHEN 1000 MG: 500 TABLET ORAL at 12:34

## 2024-07-10 RX ADMIN — IBUPROFEN 800 MG: 800 TABLET, FILM COATED ORAL at 12:33

## 2024-07-10 RX ADMIN — OXYCODONE 5 MG: 5 TABLET ORAL at 07:37

## 2024-07-10 RX ADMIN — SIMETHICONE 80 MG: 80 TABLET, CHEWABLE ORAL at 07:38

## 2024-07-10 RX ADMIN — IBUPROFEN 800 MG: 800 TABLET, FILM COATED ORAL at 04:36

## 2024-07-10 ASSESSMENT — PAIN DESCRIPTION - PAIN TYPE
TYPE: SURGICAL PAIN
TYPE: SURGICAL PAIN

## 2024-07-10 ASSESSMENT — PAIN DESCRIPTION - ORIENTATION: ORIENTATION: LOWER

## 2024-07-10 ASSESSMENT — PAIN SCALES - GENERAL
PAINLEVEL_OUTOF10: 3
PAINLEVEL_OUTOF10: 4
PAINLEVEL_OUTOF10: 5

## 2024-07-10 ASSESSMENT — PAIN DESCRIPTION - LOCATION: LOCATION: ABDOMEN

## 2024-07-10 ASSESSMENT — PAIN DESCRIPTION - FREQUENCY: FREQUENCY: INTERMITTENT

## 2024-07-10 ASSESSMENT — PAIN - FUNCTIONAL ASSESSMENT: PAIN_FUNCTIONAL_ASSESSMENT: ACTIVITIES ARE NOT PREVENTED

## 2024-07-10 ASSESSMENT — PAIN DESCRIPTION - DESCRIPTORS: DESCRIPTORS: DISCOMFORT;SORE

## 2024-07-10 NOTE — FLOWSHEET NOTE
Discharge teaching and instructions completed. AVS reviewed. Understanding verbalized.  Prescriptions filled by [facility] outpatient pharmacy.  Patient voiced understanding regarding prescriptions, follow up appointments, and care of self at home. Patient to main lobby by wheel chair. Infant in car seat on mother's lap.

## 2024-07-10 NOTE — LACTATION NOTE
In to assist with feed, baby sleepy at breast and uninterested. Encouraged to pump and supplement every 2-3 hours if baby not going to breast well. Reviewed late  behavior and discharge instructions. Encouraged to reach out to lactation as needed.
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
## 2024-07-10 NOTE — PLAN OF CARE
Problem: Postpartum  Goal: Experiences normal postpartum course  Description:  Postpartum OB-Pregnancy care plan goal which identifies if the mother is experiencing a normal postpartum course  Outcome: Progressing  Goal: Appropriate maternal -  bonding  Description:  Postpartum OB-Pregnancy care plan goal which identifies if the mother and  are bonding appropriately  Outcome: Progressing  Goal: Establishment of infant feeding pattern  Description:  Postpartum OB-Pregnancy care plan goal which identifies if the mother is establishing a feeding pattern with their   Outcome: Progressing  Goal: Incisions, wounds, or drain sites healing without S/S of infection  Outcome: Progressing     Problem: Pain  Goal: Verbalizes/displays adequate comfort level or baseline comfort level  Outcome: Progressing  Flowsheets (Taken 2024)  Verbalizes/displays adequate comfort level or baseline comfort level:   Encourage patient to monitor pain and request assistance   Assess pain using appropriate pain scale   Administer analgesics based on type and severity of pain and evaluate response   Implement non-pharmacological measures as appropriate and evaluate response     Problem: Infection - Adult  Goal: Absence of infection at discharge  Outcome: Progressing  Goal: Absence of infection during hospitalization  Outcome: Progressing  Goal: Absence of fever/infection during anticipated neutropenic period  Outcome: Progressing     Problem: Safety - Adult  Goal: Free from fall injury  Outcome: Progressing     Problem: Discharge Planning  Goal: Discharge to home or other facility with appropriate resources  Outcome: Progressing

## 2024-07-12 ENCOUNTER — POSTPARTUM VISIT (OUTPATIENT)
Dept: OBGYN CLINIC | Age: 29
End: 2024-07-12

## 2024-07-12 ENCOUNTER — HOSPITAL ENCOUNTER (OUTPATIENT)
Age: 29
Setting detail: SPECIMEN
Discharge: HOME | End: 2024-07-12

## 2024-07-12 VITALS
HEIGHT: 59 IN | BODY MASS INDEX: 31.49 KG/M2 | HEART RATE: 73 BPM | DIASTOLIC BLOOD PRESSURE: 80 MMHG | SYSTOLIC BLOOD PRESSURE: 120 MMHG

## 2024-07-12 DIAGNOSIS — O16.9 ELEVATED BLOOD PRESSURE AFFECTING PREGNANCY, ANTEPARTUM: ICD-10-CM

## 2024-07-12 PROBLEM — R82.71 GBS BACTERIURIA: Status: RESOLVED | Noted: 2024-03-07 | Resolved: 2024-07-12

## 2024-07-12 LAB
ALBUMIN SERPL-MCNC: 3.6 G/DL (ref 3.5–5.2)
ALBUMIN/GLOB SERPL: 1 {RATIO} (ref 1–2.5)
ALP SERPL-CCNC: 151 U/L (ref 35–104)
ALT SERPL-CCNC: 58 U/L (ref 10–35)
ANION GAP SERPL CALCULATED.3IONS-SCNC: 11 MMOL/L (ref 9–16)
AST SERPL-CCNC: 33 U/L (ref 10–35)
BASOPHILS # BLD: 0.09 K/UL (ref 0–0.2)
BASOPHILS NFR BLD: 1 % (ref 0–2)
BILIRUB SERPL-MCNC: 0.2 MG/DL (ref 0–1.2)
BNP SERPL-MCNC: 85 PG/ML (ref 0–300)
BUN SERPL-MCNC: 12 MG/DL (ref 6–20)
CALCIUM SERPL-MCNC: 9.2 MG/DL (ref 8.6–10.4)
CHLORIDE SERPL-SCNC: 105 MMOL/L (ref 98–107)
CO2 SERPL-SCNC: 23 MMOL/L (ref 20–31)
CREAT SERPL-MCNC: 0.6 MG/DL (ref 0.5–0.9)
CREAT UR-MCNC: 40.2 MG/DL (ref 28–217)
EOSINOPHIL # BLD: 0.2 K/UL (ref 0–0.44)
EOSINOPHILS RELATIVE PERCENT: 2 % (ref 1–4)
ERYTHROCYTE [DISTWIDTH] IN BLOOD BY AUTOMATED COUNT: 12.2 % (ref 11.8–14.4)
GFR, ESTIMATED: >90 ML/MIN/1.73M2
GLUCOSE SERPL-MCNC: 91 MG/DL (ref 74–99)
HCT VFR BLD AUTO: 31.6 % (ref 36.3–47.1)
HGB BLD-MCNC: 10.8 G/DL (ref 11.9–15.1)
IMM GRANULOCYTES # BLD AUTO: 0.45 K/UL (ref 0–0.3)
IMM GRANULOCYTES NFR BLD: 4 %
LYMPHOCYTES NFR BLD: 2.1 K/UL (ref 1.1–3.7)
LYMPHOCYTES RELATIVE PERCENT: 20 % (ref 24–43)
MCH RBC QN AUTO: 34.3 PG (ref 25.2–33.5)
MCHC RBC AUTO-ENTMCNC: 34.2 G/DL (ref 28.4–34.8)
MCV RBC AUTO: 100.3 FL (ref 82.6–102.9)
MONOCYTES NFR BLD: 0.62 K/UL (ref 0.1–1.2)
MONOCYTES NFR BLD: 6 % (ref 3–12)
NEUTROPHILS NFR BLD: 68 % (ref 36–65)
NEUTS SEG NFR BLD: 7.29 K/UL (ref 1.5–8.1)
NRBC BLD-RTO: 0 PER 100 WBC
PLATELET # BLD AUTO: 286 K/UL (ref 138–453)
PMV BLD AUTO: 10 FL (ref 8.1–13.5)
POTASSIUM SERPL-SCNC: 3.9 MMOL/L (ref 3.7–5.3)
PROT SERPL-MCNC: 6.3 G/DL (ref 6.6–8.7)
RBC # BLD AUTO: 3.15 M/UL (ref 3.95–5.11)
SODIUM SERPL-SCNC: 139 MMOL/L (ref 136–145)
TOTAL PROTEIN, URINE: 7 MG/DL
URINE TOTAL PROTEIN CREATININE RATIO: 0.17
WBC OTHER # BLD: 10.8 K/UL (ref 3.5–11.3)
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
## 2024-07-19 ENCOUNTER — POSTPARTUM VISIT (OUTPATIENT)
Dept: OBGYN CLINIC | Age: 29
End: 2024-07-19

## 2024-07-19 VITALS
DIASTOLIC BLOOD PRESSURE: 86 MMHG | BODY MASS INDEX: 29.07 KG/M2 | WEIGHT: 144 LBS | SYSTOLIC BLOOD PRESSURE: 118 MMHG | HEART RATE: 113 BPM

## 2024-07-19 DIAGNOSIS — R79.89 ELEVATED LFTS: ICD-10-CM

## 2024-07-22 ENCOUNTER — HOSPITAL ENCOUNTER (OUTPATIENT)
Age: 29
Setting detail: SPECIMEN
Discharge: HOME | End: 2024-07-22

## 2024-07-22 DIAGNOSIS — R79.89 ELEVATED LFTS: ICD-10-CM

## 2024-07-22 DIAGNOSIS — Z00.00 ANNUAL PHYSICAL EXAM: ICD-10-CM

## 2024-07-22 LAB
ALBUMIN SERPL-MCNC: 4.1 G/DL (ref 3.5–5.2)
ALBUMIN/GLOB SERPL: 1 {RATIO} (ref 1–2.5)
ALP SERPL-CCNC: 117 U/L (ref 35–104)
ALT SERPL-CCNC: 22 U/L (ref 10–35)
ANION GAP SERPL CALCULATED.3IONS-SCNC: 12 MMOL/L (ref 9–16)
AST SERPL-CCNC: 16 U/L (ref 10–35)
BILIRUB SERPL-MCNC: 0.3 MG/DL (ref 0–1.2)
BUN SERPL-MCNC: 11 MG/DL (ref 6–20)
CALCIUM SERPL-MCNC: 9.3 MG/DL (ref 8.6–10.4)
CHLORIDE SERPL-SCNC: 102 MMOL/L (ref 98–107)
CO2 SERPL-SCNC: 26 MMOL/L (ref 20–31)
CREAT SERPL-MCNC: 0.8 MG/DL (ref 0.5–0.9)
GFR, ESTIMATED: >90 ML/MIN/1.73M2
GLUCOSE SERPL-MCNC: 95 MG/DL (ref 74–99)
POTASSIUM SERPL-SCNC: 4.1 MMOL/L (ref 3.7–5.3)
PROT SERPL-MCNC: 7.1 G/DL (ref 6.6–8.7)
SODIUM SERPL-SCNC: 140 MMOL/L (ref 136–145)

## 2024-07-24 LAB
QUANTI TB GOLD PLUS: NEGATIVE
QUANTI TB1 MINUS NIL: 0.02 IU/ML
QUANTI TB2 MINUS NIL: 0.02 IU/ML
QUANTIFERON MITOGEN: 9.99 IU/ML
QUANTIFERON NIL: 0.01 IU/ML

## 2024-08-09 ENCOUNTER — POSTPARTUM VISIT (OUTPATIENT)
Dept: OBGYN CLINIC | Age: 29
End: 2024-08-09

## 2024-08-09 VITALS
HEART RATE: 83 BPM | DIASTOLIC BLOOD PRESSURE: 73 MMHG | WEIGHT: 139 LBS | SYSTOLIC BLOOD PRESSURE: 119 MMHG | HEIGHT: 59 IN | BODY MASS INDEX: 28.02 KG/M2

## 2024-08-09 RX ORDER — ACETAMINOPHEN AND CODEINE PHOSPHATE 120; 12 MG/5ML; MG/5ML
1 SOLUTION ORAL DAILY
Qty: 90 TABLET | Refills: 3 | Status: SHIPPED | OUTPATIENT
Start: 2024-08-09
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
## 2024-08-21 ENCOUNTER — TELEPHONE (OUTPATIENT)
Dept: OBGYN CLINIC | Age: 29
End: 2024-08-21

## 2024-08-21 DIAGNOSIS — B37.0 THRUSH: Primary | ICD-10-CM

## 2024-08-21 RX ORDER — METRONIDAZOLE 500 MG/1
500 TABLET ORAL 3 TIMES DAILY
Refills: 0 | Status: CANCELLED | OUTPATIENT
Start: 2024-08-21 | End: 2024-08-31

## 2024-08-21 RX ORDER — FLUCONAZOLE 150 MG/1
150 TABLET ORAL ONCE
Qty: 1 TABLET | Refills: 0 | Status: SHIPPED | OUTPATIENT
Start: 2024-08-21 | End: 2024-08-21

## 2024-08-22 ENCOUNTER — PATIENT MESSAGE (OUTPATIENT)
Dept: OBGYN CLINIC | Age: 29
End: 2024-08-22

## 2024-09-10 ENCOUNTER — HOSPITAL ENCOUNTER (OUTPATIENT)
Age: 29
Setting detail: SPECIMEN
Discharge: HOME | End: 2024-09-10

## 2024-09-10 ENCOUNTER — OFFICE VISIT (OUTPATIENT)
Dept: OBGYN CLINIC | Age: 29
End: 2024-09-10
Payer: COMMERCIAL

## 2024-09-10 VITALS
SYSTOLIC BLOOD PRESSURE: 112 MMHG | WEIGHT: 137.4 LBS | DIASTOLIC BLOOD PRESSURE: 82 MMHG | HEIGHT: 59 IN | BODY MASS INDEX: 27.7 KG/M2

## 2024-09-10 DIAGNOSIS — Z01.419 ENCOUNTER FOR GYNECOLOGICAL EXAMINATION: Primary | ICD-10-CM

## 2024-09-10 PROCEDURE — 99395 PREV VISIT EST AGE 18-39: CPT | Performed by: NURSE PRACTITIONER

## 2024-09-10 ASSESSMENT — ENCOUNTER SYMPTOMS
GASTROINTESTINAL NEGATIVE: 1
SHORTNESS OF BREATH: 0
BACK PAIN: 0
ABDOMINAL DISTENTION: 0
COUGH: 0
RESPIRATORY NEGATIVE: 1
ALLERGIC/IMMUNOLOGIC NEGATIVE: 1

## 2024-09-19 LAB — CYTOLOGY REPORT: NORMAL

## 2025-01-24 ENCOUNTER — TELEPHONE (OUTPATIENT)
Dept: OBGYN CLINIC | Age: 30
End: 2025-01-24

## 2025-01-24 NOTE — TELEPHONE ENCOUNTER
30 y/o breast fdg 6 month old, having issue with BC and her insurance is not covering IUD. Pt asking if [employer] [employer] [facility] would approve filling out PA for IUD. IUD non-formulary but if PA completed she can get approved IUD. Pt knows she has to be on progestin only to breast feed.     Pt also has new insurance.  Advised pt to up load new insurance and send pdf of PA request to [employer]eb DE JESUS.    Pt agreeable to POC.

## 2025-02-07 NOTE — PROGRESS NOTES
[facility]  [address]  [address]  [place] [address]     Deyanira Marino    returns today to have a progesterone IUD placed .  She has previously been counseled on alternative forms of contraception and has no absolute contraindications.  She denies any fever, chills, nausea/vomiting, significant abdominal/pelvic discomfort or UTI symptoms.  She's having normal menses and her LMP is  No LMP recorded. (Menstrual status: Postpartum).  and her last sexual contact was  2/2/2025- with a condom .  She presents today with no complaints and desires to proceed.    In office UPT today was negative.    Physical exam       Vitals:    02/10/25 0811   BP: 120/84        General appearance: Patient appears to be in no significant distress.  Lungs are clear to auscultation in all fields bilaterally without wheezes, rales or rhonchi.  Cardiac exam with normal sinus rhythm and rate without murmurs.  The abdomen  is soft and  non-tender without signs of infection.  There is no organomegaly or CVAT.  Bowel sounds are normally active.  No vulvar, vaginal or cervical lesions or inflammation . There is normal appearing arch.   Uterus nongravid and without CMT.  Adnexa nontender without abnormal masses bilaterally.  The anterior lip of the cervix was grasped with a single-tooth tenaculum and the uterus was sounded to 8 centimeter.  The IUD was placed uneventfully to the fundus and the IUD string trimmed to approximately 2.5-3 cm.  Post insertion instructions discussed and given to the patient.      ICD-10-CM    1. Encounter for IUD insertion  Z30.430 POCT urine pregnancy     INSERT INTRAUTERINE DEVICE     levonorgestrel (MIRENA) IUD 52 mg 1 each        She will return to the office  in 6-8 weeks for a brief IUD checkup or when necessary    VANESSA Chinchilla - CNP 2/10/2025 8:33 AM   [facility]

## 2025-02-10 ENCOUNTER — PROCEDURE VISIT (OUTPATIENT)
Dept: OBGYN CLINIC | Age: 30
End: 2025-02-10
Payer: COMMERCIAL

## 2025-02-10 VITALS
BODY MASS INDEX: 27.26 KG/M2 | SYSTOLIC BLOOD PRESSURE: 120 MMHG | WEIGHT: 135.2 LBS | HEIGHT: 59 IN | DIASTOLIC BLOOD PRESSURE: 84 MMHG

## 2025-02-10 DIAGNOSIS — Z30.430 ENCOUNTER FOR IUD INSERTION: Primary | ICD-10-CM

## 2025-02-10 LAB
CONTROL: PRESENT
PREGNANCY TEST URINE, POC: NEGATIVE

## 2025-02-10 PROCEDURE — 81025 URINE PREGNANCY TEST: CPT | Performed by: NURSE PRACTITIONER

## 2025-02-10 PROCEDURE — 58300 INSERT INTRAUTERINE DEVICE: CPT | Performed by: NURSE PRACTITIONER

## 2025-02-10 NOTE — PROGRESS NOTES
Chaperone for Intimate Exam  Chaperone was offered and accepted as part of the rooming process.  Chaperone: Yanira Gunderson CMA

## 2025-02-21 ENCOUNTER — HOSPITAL ENCOUNTER (OUTPATIENT)
Age: 30
Setting detail: SPECIMEN
Discharge: HOME | End: 2025-02-21

## 2025-02-21 DIAGNOSIS — N39.0 URINARY TRACT INFECTION WITHOUT HEMATURIA, SITE UNSPECIFIED: ICD-10-CM

## 2025-02-21 DIAGNOSIS — N39.0 URINARY TRACT INFECTION WITHOUT HEMATURIA, SITE UNSPECIFIED: Primary | ICD-10-CM

## 2025-02-21 LAB
BACTERIA URNS QL MICRO: ABNORMAL
BILIRUB UR QL STRIP: NEGATIVE
CASTS #/AREA URNS LPF: ABNORMAL /LPF (ref 0–2)
CLARITY UR: ABNORMAL
COLOR UR: YELLOW
EPI CELLS #/AREA URNS HPF: ABNORMAL /HPF (ref 0–5)
GLUCOSE UR STRIP-MCNC: NEGATIVE MG/DL
HGB UR QL STRIP.AUTO: ABNORMAL
KETONES UR STRIP-MCNC: NEGATIVE MG/DL
LEUKOCYTE ESTERASE UR QL STRIP: ABNORMAL
NITRITE UR QL STRIP: NEGATIVE
PH UR STRIP: 5.5 [PH] (ref 5–8)
PROT UR STRIP-MCNC: ABNORMAL MG/DL
RBC #/AREA URNS HPF: ABNORMAL /HPF (ref 0–2)
SP GR UR STRIP: 1.02 (ref 1–1.03)
UROBILINOGEN UR STRIP-ACNC: NORMAL EU/DL (ref 0–1)
WBC #/AREA URNS HPF: ABNORMAL /HPF (ref 0–5)

## 2025-02-21 NOTE — PROGRESS NOTES
Pt called with Uti symptoms  - burning while urinating   - burning after urination  -frequency  -urgency     Urine orders sent   Pt going to [facility]

## 2025-02-23 LAB
MICROORGANISM SPEC CULT: ABNORMAL
MICROORGANISM SPEC CULT: ABNORMAL
SERVICE CMNT-IMP: ABNORMAL
SPECIMEN DESCRIPTION: ABNORMAL

## 2025-02-24 RX ORDER — SULFAMETHOXAZOLE AND TRIMETHOPRIM 800; 160 MG/1; MG/1
1 TABLET ORAL 2 TIMES DAILY
Qty: 10 TABLET | Refills: 0 | Status: SHIPPED | OUTPATIENT
Start: 2025-02-24 | End: 2025-03-01

## 2025-03-02 ENCOUNTER — PATIENT MESSAGE (OUTPATIENT)
Dept: OBGYN CLINIC | Age: 30
End: 2025-03-02

## 2025-03-02 DIAGNOSIS — R30.0 DYSURIA: Primary | ICD-10-CM

## 2025-03-26 ENCOUNTER — OFFICE VISIT (OUTPATIENT)
Dept: FAMILY MEDICINE CLINIC | Age: 30
End: 2025-03-26
Payer: COMMERCIAL

## 2025-03-26 VITALS
DIASTOLIC BLOOD PRESSURE: 76 MMHG | HEIGHT: 59 IN | WEIGHT: 135 LBS | SYSTOLIC BLOOD PRESSURE: 114 MMHG | TEMPERATURE: 98.4 F | HEART RATE: 74 BPM | BODY MASS INDEX: 27.21 KG/M2 | OXYGEN SATURATION: 98 %

## 2025-03-26 DIAGNOSIS — J01.90 ACUTE BACTERIAL SINUSITIS: Primary | ICD-10-CM

## 2025-03-26 DIAGNOSIS — B96.89 ACUTE BACTERIAL SINUSITIS: Primary | ICD-10-CM

## 2025-03-26 PROCEDURE — 99213 OFFICE O/P EST LOW 20 MIN: CPT | Performed by: NURSE PRACTITIONER
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
## 2025-04-10 ENCOUNTER — PROCEDURE VISIT (OUTPATIENT)
Dept: OBGYN CLINIC | Age: 30
End: 2025-04-10
Payer: COMMERCIAL

## 2025-04-10 VITALS
DIASTOLIC BLOOD PRESSURE: 84 MMHG | BODY MASS INDEX: 26.77 KG/M2 | HEIGHT: 59 IN | SYSTOLIC BLOOD PRESSURE: 126 MMHG | WEIGHT: 132.8 LBS

## 2025-04-10 DIAGNOSIS — Z30.431 ENCOUNTER FOR ROUTINE CHECKING OF INTRAUTERINE CONTRACEPTIVE DEVICE (IUD): Primary | ICD-10-CM

## 2025-04-10 PROCEDURE — 99213 OFFICE O/P EST LOW 20 MIN: CPT | Performed by: NURSE PRACTITIONER

## 2025-04-10 ASSESSMENT — ENCOUNTER SYMPTOMS
GASTROINTESTINAL NEGATIVE: 1
RESPIRATORY NEGATIVE: 1

## 2025-04-10 NOTE — PROGRESS NOTES
Chaperone for Intimate Exam  Chaperone was offered as part of the rooming process. Patient declined and agrees to continue with exam without a chaperone.  Chaperone: NONE       
          Assessment/Plan   Diagnosis Orders   1. Encounter for routine checking of intrauterine contraceptive device (IUD)          1. Encounter for routine checking of intrauterine contraceptive device (IUD)       IUD strings visualized at appropriate length  Discussed starting effexor prior to having IUD removed in anticipation of trying for pregnancy  Won't be for a year or so  Had intrusive thoughts and severe anxiety while pregnant          Return in about 22 weeks (around 9/11/2025) for annual exam.        Electronically signed by VANESSA Chinchilla CNP 4/10/2025 9:24 AM

## 2025-06-23 ASSESSMENT — PATIENT HEALTH QUESTIONNAIRE - PHQ9
SUM OF ALL RESPONSES TO PHQ QUESTIONS 1-9: 0
SUM OF ALL RESPONSES TO PHQ QUESTIONS 1-9: 0
2. FEELING DOWN, DEPRESSED OR HOPELESS: NOT AT ALL
1. LITTLE INTEREST OR PLEASURE IN DOING THINGS: NOT AT ALL
1. LITTLE INTEREST OR PLEASURE IN DOING THINGS: NOT AT ALL
SUM OF ALL RESPONSES TO PHQ QUESTIONS 1-9: 0
SUM OF ALL RESPONSES TO PHQ QUESTIONS 1-9: 0
2. FEELING DOWN, DEPRESSED OR HOPELESS: NOT AT ALL
SUM OF ALL RESPONSES TO PHQ9 QUESTIONS 1 & 2: 0

## 2025-06-26 ENCOUNTER — HOSPITAL ENCOUNTER (OUTPATIENT)
Age: 30
Setting detail: SPECIMEN
Discharge: HOME | End: 2025-06-26

## 2025-06-26 ENCOUNTER — OFFICE VISIT (OUTPATIENT)
Dept: PRIMARY CARE CLINIC | Age: 30
End: 2025-06-26
Payer: COMMERCIAL

## 2025-06-26 VITALS
BODY MASS INDEX: 27.38 KG/M2 | HEART RATE: 82 BPM | DIASTOLIC BLOOD PRESSURE: 76 MMHG | SYSTOLIC BLOOD PRESSURE: 114 MMHG | WEIGHT: 135.8 LBS | OXYGEN SATURATION: 96 % | HEIGHT: 59 IN

## 2025-06-26 DIAGNOSIS — D64.9 ANEMIA, UNSPECIFIED TYPE: ICD-10-CM

## 2025-06-26 DIAGNOSIS — Z00.00 ANNUAL PHYSICAL EXAM: Primary | ICD-10-CM

## 2025-06-26 DIAGNOSIS — R00.2 PALPITATIONS: ICD-10-CM

## 2025-06-26 DIAGNOSIS — Z13.1 SCREENING FOR DIABETES MELLITUS (DM): ICD-10-CM

## 2025-06-26 DIAGNOSIS — Z13.6 SCREENING FOR CARDIOVASCULAR CONDITION: ICD-10-CM

## 2025-06-26 LAB
ALBUMIN SERPL-MCNC: 4.2 G/DL (ref 3.5–5.2)
ALBUMIN/GLOB SERPL: 1.4 {RATIO} (ref 1–2.5)
ALP SERPL-CCNC: 76 U/L (ref 35–104)
ALT SERPL-CCNC: 19 U/L (ref 10–35)
ANION GAP SERPL CALCULATED.3IONS-SCNC: 9 MMOL/L (ref 9–16)
AST SERPL-CCNC: 17 U/L (ref 10–35)
BASOPHILS # BLD: <0.03 K/UL (ref 0–0.2)
BASOPHILS NFR BLD: 0 % (ref 0–2)
BILIRUB SERPL-MCNC: 0.7 MG/DL (ref 0–1.2)
BUN SERPL-MCNC: 16 MG/DL (ref 6–20)
CALCIUM SERPL-MCNC: 9.4 MG/DL (ref 8.6–10.4)
CHLORIDE SERPL-SCNC: 104 MMOL/L (ref 98–107)
CHOLEST SERPL-MCNC: 172 MG/DL (ref 0–199)
CHOLESTEROL/HDL RATIO: 3
CO2 SERPL-SCNC: 28 MMOL/L (ref 20–31)
CREAT SERPL-MCNC: 0.8 MG/DL (ref 0.6–0.9)
EOSINOPHIL # BLD: 0.13 K/UL (ref 0–0.44)
EOSINOPHILS RELATIVE PERCENT: 2 % (ref 1–4)
ERYTHROCYTE [DISTWIDTH] IN BLOOD BY AUTOMATED COUNT: 11.4 % (ref 11.8–14.4)
FERRITIN SERPL-MCNC: 75 NG/ML (ref 15–150)
FOLATE SERPL-MCNC: 15.9 NG/ML (ref 4.8–24.2)
GFR, ESTIMATED: >90 ML/MIN/1.73M2
GLUCOSE SERPL-MCNC: 81 MG/DL (ref 74–99)
HCT VFR BLD AUTO: 42.3 % (ref 36.3–47.1)
HDLC SERPL-MCNC: 58 MG/DL
HGB BLD-MCNC: 14.4 G/DL (ref 11.9–15.1)
IMM GRANULOCYTES # BLD AUTO: <0.03 K/UL (ref 0–0.3)
IMM GRANULOCYTES NFR BLD: 0 %
IRON SATN MFR SERPL: 37 % (ref 20–55)
IRON SERPL-MCNC: 116 UG/DL (ref 37–145)
LDLC SERPL CALC-MCNC: 105 MG/DL (ref 0–100)
LYMPHOCYTES NFR BLD: 2.4 K/UL (ref 1.1–3.7)
LYMPHOCYTES RELATIVE PERCENT: 43 % (ref 24–43)
MCH RBC QN AUTO: 31.6 PG (ref 25.2–33.5)
MCHC RBC AUTO-ENTMCNC: 34 G/DL (ref 28.4–34.8)
MCV RBC AUTO: 93 FL (ref 82.6–102.9)
MONOCYTES NFR BLD: 0.43 K/UL (ref 0.1–1.2)
MONOCYTES NFR BLD: 8 % (ref 3–12)
NEUTROPHILS NFR BLD: 47 % (ref 36–65)
NEUTS SEG NFR BLD: 2.61 K/UL (ref 1.5–8.1)
NRBC BLD-RTO: 0 PER 100 WBC
PLATELET # BLD AUTO: 239 K/UL (ref 138–453)
PMV BLD AUTO: 10.7 FL (ref 8.1–13.5)
POTASSIUM SERPL-SCNC: 4.1 MMOL/L (ref 3.7–5.3)
PROT SERPL-MCNC: 7.1 G/DL (ref 6.6–8.7)
RBC # BLD AUTO: 4.55 M/UL (ref 3.95–5.11)
SODIUM SERPL-SCNC: 141 MMOL/L (ref 136–145)
TIBC SERPL-MCNC: 316 UG/DL (ref 250–450)
TRIGL SERPL-MCNC: 46 MG/DL
TSH SERPL DL<=0.05 MIU/L-ACNC: 3.4 UIU/ML (ref 0.27–4.2)
UNSATURATED IRON BINDING CAPACITY: 200 UG/DL (ref 112–347)
VIT B12 SERPL-MCNC: 728 PG/ML (ref 232–1245)
VLDLC SERPL CALC-MCNC: 9 MG/DL (ref 1–30)
WBC OTHER # BLD: 5.6 K/UL (ref 3.5–11.3)

## 2025-06-26 PROCEDURE — 99395 PREV VISIT EST AGE 18-39: CPT | Performed by: NURSE PRACTITIONER

## 2025-06-26 SDOH — ECONOMIC STABILITY: FOOD INSECURITY: WITHIN THE PAST 12 MONTHS, THE FOOD YOU BOUGHT JUST DIDN'T LAST AND YOU DIDN'T HAVE MONEY TO GET MORE.: NEVER TRUE

## 2025-06-26 SDOH — ECONOMIC STABILITY: FOOD INSECURITY: WITHIN THE PAST 12 MONTHS, YOU WORRIED THAT YOUR FOOD WOULD RUN OUT BEFORE YOU GOT MONEY TO BUY MORE.: NEVER TRUE

## 2025-06-26 ASSESSMENT — PATIENT HEALTH QUESTIONNAIRE - PHQ9
SUM OF ALL RESPONSES TO PHQ QUESTIONS 1-9: 0
2. FEELING DOWN, DEPRESSED OR HOPELESS: NOT AT ALL
1. LITTLE INTEREST OR PLEASURE IN DOING THINGS: NOT AT ALL

## 2025-06-26 ASSESSMENT — ENCOUNTER SYMPTOMS
NAUSEA: 0
ABDOMINAL PAIN: 0
CHEST TIGHTNESS: 0
DIARRHEA: 0
SORE THROAT: 0
SHORTNESS OF BREATH: 0
VOMITING: 0
RHINORRHEA: 0
COLOR CHANGE: 0

## 2025-06-26 NOTE — PROGRESS NOTES
is alert and oriented to person, place, and time.   Psychiatric:         Mood and Affect: Mood normal.         Behavior: Behavior normal.           :       Diagnosis Orders   1. Annual physical exam        2. Anemia, unspecified type  CBC with Auto Differential    Iron and TIBC    Ferritin    Vitamin B12 & Folate      3. Palpitations  TSH reflex to FT4      4. Screening for cardiovascular condition  Lipid Panel      5. Screening for diabetes mellitus (DM)  Comprehensive Metabolic Panel                :   Assessment & Plan       1. Annual physical exam  Update annual labs, continue healthy diet and exercise    2. Anemia, unspecified type  Hx of anemia, will recheck. Ok to continue oral iron daily as that has helped with symptoms   - CBC with Auto Differential; Future  - Iron and TIBC; Future  - Ferritin; Future  - Vitamin B12 & Folate; Future    3. Palpitations  New, improved with oral iron, check thyroid. If persists or worsens, consider echo and EKG  - TSH reflex to FT4; Future    4. Screening for cardiovascular condition  - Lipid Panel; Future    5. Screening for diabetes mellitus (DM)  - Comprehensive Metabolic Panel; Future      Return in about 1 year (around 6/26/2026) for annual exam .     Patient given educational materials - see patient instructions.  Discussed use, benefit, and side effects of prescribed medications.  All patient questions answered. Pt voiced understanding. Reviewed health maintenance.  Instructed to continue current medications, diet and exercise.  Patient agreed with treatment plan. Follow up as directed.       Electronicallysigned by VANESSA Monzon CNP on 6/26/2025 at 9:19 AM

## 2025-06-30 ENCOUNTER — RESULTS FOLLOW-UP (OUTPATIENT)
Dept: PRIMARY CARE CLINIC | Age: 30
End: 2025-06-30

## (undated) DEVICE — SOLUTION SOD CHL 0.9% 1000ML

## (undated) DEVICE — TOWEL SURG W16XL26IN WHT NONFENESTRATED ST 2 PER PK

## (undated) DEVICE — SUTURE MONOCRYL SZ 0 L36IN ABSRB VLT L48MM CTX 1/2 CIR Y398H

## (undated) DEVICE — PREVENA INCISION MANAGEMENT SYSTEM- PEEL & PLACE DRESSING: Brand: PREVENA™ PEEL & PLACE™

## (undated) DEVICE — SUTURE MONOCRYL SZ 4-0 L27IN ABSRB UD L19MM PS-2 1/2 CIR PRIM Y426H

## (undated) DEVICE — SUTURE VICRYL 3-0 L36IN ABSRB VLT CT-1 L36MM 1/2 CIR J344H

## (undated) DEVICE — STERILE POLYISOPRENE POWDER-FREE SURGICAL GLOVES WITH EMOLLIENT COATING: Brand: PROTEXIS

## (undated) DEVICE — TRAY SPNL 24GA L4IN PENCAN PNCL PNT NDL 0.75% BIPIVCAIN W/

## (undated) DEVICE — KENDALL SCD EXPRESS SLEEVES, KNEE LENGTH, MEDIUM: Brand: KENDALL SCD

## (undated) DEVICE — Device

## (undated) DEVICE — 450 ML BOTTLE OF 0.05% CHLORHEXIDINE GLUCONATE IN 99.95% STERILE WATER FOR IRRIGATION, USP AND APPLICATOR.: Brand: IRRISEPT ANTIMICROBIAL WOUND LAVAGE

## (undated) DEVICE — SOLUTION IRRIGATION STRL H2O 1000 ML UROMATIC CONTAINER

## (undated) DEVICE — SUTURE VICRYL SZ 0 L36IN ABSRB UD L36MM CT-1 1/2 CIR J946H